# Patient Record
Sex: FEMALE | HISPANIC OR LATINO | Employment: STUDENT | URBAN - METROPOLITAN AREA
[De-identification: names, ages, dates, MRNs, and addresses within clinical notes are randomized per-mention and may not be internally consistent; named-entity substitution may affect disease eponyms.]

---
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
## 2019-01-23 DIAGNOSIS — I10 BENIGN ESSENTIAL HYPERTENSION: ICD-10-CM

## 2019-01-23 RX ORDER — ATENOLOL 50 MG/1
50 TABLET ORAL DAILY
Qty: 30 TABLET | Refills: 11 | Status: SHIPPED | OUTPATIENT
Start: 2019-01-23 | End: 2020-06-22

## 2019-03-06 DIAGNOSIS — F90.2 ADHD (ATTENTION DEFICIT HYPERACTIVITY DISORDER), COMBINED TYPE: ICD-10-CM

## 2019-03-06 RX ORDER — DEXTROAMPHETAMINE SACCHARATE, AMPHETAMINE ASPARTATE MONOHYDRATE, DEXTROAMPHETAMINE SULFATE AND AMPHETAMINE SULFATE 5; 5; 5; 5 MG/1; MG/1; MG/1; MG/1
20 CAPSULE, EXTENDED RELEASE ORAL DAILY
Qty: 30 CAPSULE | Refills: 0 | Status: SHIPPED | OUTPATIENT
Start: 2019-03-06

## 2019-03-06 NOTE — TELEPHONE ENCOUNTER
Telephone visit from 10/10/18  Assessment: 1.  Anxiety. Well-controlled with weekly CBT and current dose of escitalopram 15 mg po q D.  2.  ADHD.  Currently well-controlled on current dose of Adderall.  Mother will call me with need for refills.     Advice/instructions given to patient/guardian including prescriptions, follow up appointment or orders for diagnostic testing:  Follow-up during winter break, or sooner by phone as needed.    Routing to Dr. Marr.    Milla Owens RN

## 2019-03-06 NOTE — TELEPHONE ENCOUNTER
Rx: amphetamine-dextroamphetamine (ADDERALL XR) 20 MG hr capsule has been dropped at the [facility] as requested..Kenia Ross,

## 2019-03-06 NOTE — TELEPHONE ENCOUNTER
Reason for Call:  Medication or medication refill:    Do you use a [facility]?  Name of the pharmacy and phone number for the current request:  Patient     Name of the medication requested: amphetamine-dextroamphetamine (ADDERALL XR) 20 MG per 24 hr capsule ()    Other request:  Mom wondering if can get 1 month refill has appointment with you on 4-3-19 @ 7:00.  Mom hoping to tomorrow or Friday.   Any question please call.    Can we leave a detailed message on this number? YES    Phone number patient can be reached at: Home number on file [phone] (home)    Best Time:  Anytime     Call taken on 3/6/2019 at 12:07 PM by China Mcclellan

## 2019-03-31 ENCOUNTER — TRANSFERRED RECORDS (OUTPATIENT)
Dept: HEALTH INFORMATION MANAGEMENT | Facility: CLINIC | Age: 19
End: 2019-03-31

## 2019-04-01 ENCOUNTER — TELEPHONE (OUTPATIENT)
Dept: PEDIATRICS | Facility: CLINIC | Age: 19
End: 2019-04-01

## 2019-04-01 NOTE — TELEPHONE ENCOUNTER
Reason for Call:  Other Recent Hospitalization    Detailed comments: Mom is calling to let Dr Marr know that the patient was hospitalized on 3/31/19 with pan colitis and will likely be discharged tomorrow 04/2/19.  Mom states she will have the records sent to the clinic before Evette's appointment on 4/17/19    Phone Number Patient can be reached at: Cell number on file:    Telephone Information:   Kiara (mom) [phone]       Best Time: Any     Can we leave a detailed message on this number? YES    Call taken on 4/1/2019 at 6:31 PM by Yina Mahan

## 2019-04-10 ENCOUNTER — OFFICE VISIT (OUTPATIENT)
Dept: URGENT CARE | Facility: URGENT CARE | Age: 19
End: 2019-04-10
Payer: COMMERCIAL

## 2019-04-10 VITALS
HEART RATE: 75 BPM | OXYGEN SATURATION: 99 % | RESPIRATION RATE: 20 BRPM | DIASTOLIC BLOOD PRESSURE: 78 MMHG | BODY MASS INDEX: 29.04 KG/M2 | SYSTOLIC BLOOD PRESSURE: 120 MMHG | TEMPERATURE: 97.3 F | WEIGHT: 145 LBS

## 2019-04-10 DIAGNOSIS — R30.0 DYSURIA: ICD-10-CM

## 2019-04-10 DIAGNOSIS — N76.0 BV (BACTERIAL VAGINOSIS): Primary | ICD-10-CM

## 2019-04-10 DIAGNOSIS — B96.89 BV (BACTERIAL VAGINOSIS): Primary | ICD-10-CM

## 2019-04-10 LAB
ALBUMIN UR-MCNC: NEGATIVE MG/DL
APPEARANCE UR: CLEAR
BACTERIA #/AREA URNS HPF: ABNORMAL /HPF
BILIRUB UR QL STRIP: NEGATIVE
COLOR UR AUTO: YELLOW
GLUCOSE UR STRIP-MCNC: NEGATIVE MG/DL
HGB UR QL STRIP: ABNORMAL
KETONES UR STRIP-MCNC: NEGATIVE MG/DL
LEUKOCYTE ESTERASE UR QL STRIP: ABNORMAL
NITRATE UR QL: NEGATIVE
NON-SQ EPI CELLS #/AREA URNS LPF: ABNORMAL /LPF
PH UR STRIP: 5.5 PH (ref 5–7)
RBC #/AREA URNS AUTO: ABNORMAL /HPF
SOURCE: ABNORMAL
SP GR UR STRIP: >1.03 (ref 1–1.03)
SPECIMEN SOURCE: ABNORMAL
UROBILINOGEN UR STRIP-ACNC: 0.2 EU/DL (ref 0.2–1)
WBC #/AREA URNS AUTO: ABNORMAL /HPF
WET PREP SPEC: ABNORMAL

## 2019-04-10 PROCEDURE — 87086 URINE CULTURE/COLONY COUNT: CPT | Performed by: FAMILY MEDICINE

## 2019-04-10 PROCEDURE — 81001 URINALYSIS AUTO W/SCOPE: CPT | Performed by: PHYSICIAN ASSISTANT

## 2019-04-10 PROCEDURE — 87210 SMEAR WET MOUNT SALINE/INK: CPT | Performed by: PHYSICIAN ASSISTANT

## 2019-04-10 PROCEDURE — 99213 OFFICE O/P EST LOW 20 MIN: CPT | Performed by: FAMILY MEDICINE

## 2019-04-10 RX ORDER — METRONIDAZOLE 500 MG/1
500 TABLET ORAL 2 TIMES DAILY
Qty: 14 TABLET | Refills: 0 | Status: SHIPPED | OUTPATIENT
Start: 2019-04-10 | End: 2019-04-17

## 2019-04-11 LAB
BACTERIA SPEC CULT: NORMAL
SPECIMEN SOURCE: NORMAL

## 2019-04-11 NOTE — PROGRESS NOTES
SUBJECTIVE:   Kajal Doan is a 18 year old female who  presents today for a possible UTI. Symptoms of frequency have been going on for 3day(s).  Hematuria no.  gradual onset and worseningand moderate.  There is no history of fever, chills, nausea or vomiting.  No history of vaginal itchiness, no concerns for STD. This patient does not have a history of urinary tract infections.    Patient was seen recently at [facility] and [facility] that had screening done for STD and pregnancy and was negative.  Currently has Implanon    Past Medical History:   Diagnosis Date     Pediatric hypertension      Current Outpatient Medications   Medication Sig Dispense Refill     albuterol (PROAIR HFA, PROVENTIL HFA, VENTOLIN HFA) 108 (90 BASE) MCG/ACT inhaler Take two puffs 15 minutes before any exercise. 1 Inhaler 1     cholecalciferol (VITAMIN D3) 5000 UNITS CAPS capsule Take  by mouth daily.       FOLIC ACID PO Take 1 mg by mouth daily.       hydrocortisone 2.5 % cream Apply to affected areas around nose daily for 3-4 days, then stop. 30 g 3     amphetamine-dextroamphetamine (ADDERALL XR) 20 MG 24 hr capsule Take 1 capsule (20 mg) by mouth daily 30 capsule 0     atenolol (TENORMIN) 50 MG tablet Take 1 tablet (50 mg) by mouth daily 30 tablet 11     escitalopram (LEXAPRO) 10 MG tablet Take 1 tablet (10 mg) by mouth daily 30 tablet 3     FISH OIL        norgestim-eth estrad triphasic (ORTHO TRI-CYCLEN LO) 0.18/0.215/0.25 MG-25 MCG per tablet Take 1 tablet by mouth daily 84 tablet 1     Social History     Tobacco Use     Smoking status: Never Smoker     Smokeless tobacco: Never Used     Tobacco comment: non smoking home   Substance Use Topics     Alcohol use: No       ROS:   Review of systems negative except as stated above.    OBJECTIVE:  /78 (BP Location: Right arm, Patient Position: Left side, Cuff Size: Adult Small)   Pulse 75   Temp 97.3  F (36.3  C)   Resp 20   Wt 65.8 kg (145 lb)   SpO2 99%   BMI 29.04 kg/m     GENERAL APPEARANCE: healthy, alert and no distress  PSYCH: mentation appears normal and affect normal/bright    Results for orders placed or performed in visit on 04/10/19   UA reflex to Microscopic and Culture   Result Value Ref Range    Color Urine Yellow     Appearance Urine Clear     Glucose Urine Negative NEG^Negative mg/dL    Bilirubin Urine Negative NEG^Negative    Ketones Urine Negative NEG^Negative mg/dL    Specific Gravity Urine >1.030 1.003 - 1.035    Blood Urine Trace (A) NEG^Negative    pH Urine 5.5 5.0 - 7.0 pH    Protein Albumin Urine Negative NEG^Negative mg/dL    Urobilinogen Urine 0.2 0.2 - 1.0 EU/dL    Nitrite Urine Negative NEG^Negative    Leukocyte Esterase Urine Trace (A) NEG^Negative    Source Midstream Urine    Urine Microscopic   Result Value Ref Range    WBC Urine 0 - 5 OTO5^0 - 5 /HPF    RBC Urine O - 2 OTO2^O - 2 /HPF    Squamous Epithelial /LPF Urine Few FEW^Few /LPF    Bacteria Urine Few (A) NEG^Negative /HPF   Wet prep   Result Value Ref Range    Specimen Description Vagina     Wet Prep No yeast seen     Wet Prep No Trichomonas seen     Wet Prep Clue cells seen (A)     Wet Prep WBC'S seen        ASSESSMENT/PLAN:   (N76.0,  B96.89) BV (bacterial vaginosis)  (primary encounter diagnosis)  Plan: metroNIDAZOLE (FLAGYL) 500 MG tablet            (R30.0) Dysuria  Plan: UA reflex to Microscopic and Culture, Wet prep,        Urine Microscopic, Urine Culture Aerobic         Bacterial            Reviewed initial labs, reassurance given, drink plenty of fluids.  Will follow up on urine culture and treat if true positive for UTI.  Prevention and treatment of UTI's discussed.Signs and symptoms of pyelonephritis mentioned.  Reviewed frequent recurrence of BV, desires treatment so will give RX Flagyl.  no alcohol use with this antibiotic.  Encourage safe sex, condom usage.  Declined STD screening.    Follow up with primary provider if no improvement of symptoms in 1 week    Maximus Simms MD  April 10,  2019 7:34 PM

## 2019-04-11 NOTE — PATIENT INSTRUCTIONS
Take full course of Flagyl (metronidazole) to treat bacterial vaginosis (clue cells). No alcohol use as this will cause you to be sick /bad side effects  We will contact you if the urine culture is positive for true infection - you will need a new antibiotic for this.    No sex for 1 week, use condoms to minimize spread of clue cells.          Patient Education     Vaginal Infection: Bacterial Vaginosis  Both good and bad bacteria are present in a healthy vagina. Bacterial vaginosis (BV) occurs when these bacteria get out of balance. The numbers of good bacteria decrease. This allows the numbers of bad bacteria to increase and cause BV. In most cases, BV is not a serious problem.  Causes of bacterial vaginosis  The cause of BV is not clear. Douching may lead to it. Having sex with a new partner or more than 1 partner makes it more likely.  Symptoms of bacterial vaginosis  Symptoms of BV vary for each woman. Some women have few symptoms or none at all. If symptoms are present, they can include:    Thin, milky white or gray or sometimes green discharge    Unpleasant  fishy  odor    Irritation, itching, and burning at opening of vagina which may indicate mixed vaginitis      Burning or irritation with sex or urination which may indicate mixed vaginitis  Diagnosing bacterial vaginosis  Your healthcare provider will ask about your symptoms and health history. He or she will also do a pelvic exam. This is an exam of your vagina and cervix. A sample of vaginal fluid or discharge may be taken. This sample is checked for signs of BV.  Treating bacterial vaginosis  BV is often treated with antibiotics. They may be given in oral pill form or as a vaginal cream. To use these medicines:    Be sure to take all of your medicine, even if your symptoms go away.    If you re taking antibiotic pills, do not drink alcohol until you re finished with all of your medicine.    If you re using vaginal cream, apply it as directed. Be aware  "that the cream may make condoms and diaphragms less effective.    Call your healthcare provider if symptoms do not go away within 4 days of starting treatment. Also call if you have a reaction to the medicine.  Why treatment matters  Even if you have no symptoms or your symptoms are mild, BV should be treated. Untreated BV can lead to health problems such as:    Increased risk of  delivery if you re pregnant    Increased risk of complications after surgery on the reproductive organs    Possible increased risk of pelvic inflammatory disease (PID)   Date Last Reviewed: 3/1/2017    5129-5772 [employer]. [address] [place] [address]. All rights reserved. This information is not intended as a substitute for professional medical care. Always follow your healthcare professional's instructions.           Patient Education     Dysuria     Painful urination (dysuria) is often caused by a problem in the urinary tract.   Dysuria is pain felt during urination. It is often described as a burning. Learn more about this problem and how it can be treated.  What causes dysuria?  Possible causes include:    Infection with a bacteria or virus such as a urinary tract infection (UTI or a sexually transmitted infection (STI)    Sensitivity or allergy to chemicals such as those found in lotions and other products    Prostate or bladder problems    Radiation therapy to the pelvic area  How is dysuria diagnosed?  Your healthcare provider will examine you. He or she will ask about your symptoms and health. After talking with you and doing a physical exam, your healthcare provider may know what is causing your dysuria. He or she will usually request  a sample of your urine. Tests of your urine, or a \"urinalysis,\" are done. A urinalysis may include:    Looking at the urine sample (visual exam)    Checking for substances (chemical exam)    Looking at a small amount under a microscope (microscopic exam)  Some " parts of the urinalysis may be done in the provider's office and some in a lab. And, the urine sample may be checked for bacteria and yeast (urine culture). Your healthcare provider will tell you more about these tests if they are needed.  How is dysuria treated?  Treatment depends on the cause. If you have a bacterial infection, you may need antibiotics. You may be given medicines to make it easier for you to urinate and help relieve pain. Your healthcare provider can tell you more about your treatment options. Untreated, symptoms may get worse.  When to call your healthcare provider  Call the healthcare provider right away if you have any of the following:    Fever of 100.4 F (38 C) or higher     No improvement after three days of treatment    Trouble urinating because of pain    New or increased discharge from the vagina or penis    Rash or joint pain    Increased back or abdominal pain    Enlarged painful lymph nodes (lumps) in the groin   Date Last Reviewed: 1/1/2017 2000-2018 [employer]. [address] [place] [address], [place] [address]. All rights reserved. This information is not intended as a substitute for professional medical care. Always follow your healthcare professional's instructions.

## 2019-04-17 ENCOUNTER — OFFICE VISIT (OUTPATIENT)
Dept: PEDIATRICS | Facility: CLINIC | Age: 19
End: 2019-04-17
Payer: COMMERCIAL

## 2019-04-17 VITALS
DIASTOLIC BLOOD PRESSURE: 78 MMHG | HEART RATE: 78 BPM | HEIGHT: 59 IN | SYSTOLIC BLOOD PRESSURE: 120 MMHG | WEIGHT: 168.6 LBS | TEMPERATURE: 97.8 F | BODY MASS INDEX: 33.99 KG/M2

## 2019-04-17 DIAGNOSIS — F41.1 GENERALIZED ANXIETY DISORDER: Primary | ICD-10-CM

## 2019-04-17 DIAGNOSIS — F90.2 ADHD (ATTENTION DEFICIT HYPERACTIVITY DISORDER), COMBINED TYPE: ICD-10-CM

## 2019-04-17 DIAGNOSIS — Z11.3 SCREEN FOR STD (SEXUALLY TRANSMITTED DISEASE): ICD-10-CM

## 2019-04-17 DIAGNOSIS — J45.990 EXERCISE-INDUCED ASTHMA: ICD-10-CM

## 2019-04-17 DIAGNOSIS — Z59.00 HOUSING LACK: ICD-10-CM

## 2019-04-17 PROCEDURE — 87591 N.GONORRHOEAE DNA AMP PROB: CPT | Performed by: PEDIATRICS

## 2019-04-17 PROCEDURE — 99215 OFFICE O/P EST HI 40 MIN: CPT | Performed by: PEDIATRICS

## 2019-04-17 PROCEDURE — 87491 CHLMYD TRACH DNA AMP PROBE: CPT | Performed by: PEDIATRICS

## 2019-04-17 RX ORDER — DEXTROAMPHETAMINE SACCHARATE, AMPHETAMINE ASPARTATE MONOHYDRATE, DEXTROAMPHETAMINE SULFATE AND AMPHETAMINE SULFATE 5; 5; 5; 5 MG/1; MG/1; MG/1; MG/1
20 CAPSULE, EXTENDED RELEASE ORAL DAILY
Qty: 30 CAPSULE | Refills: 0 | Status: SHIPPED | OUTPATIENT
Start: 2019-04-17 | End: 2019-05-17

## 2019-04-17 RX ORDER — ESCITALOPRAM OXALATE 10 MG/1
10 TABLET ORAL DAILY
Qty: 30 TABLET | Refills: 3 | Status: SHIPPED | OUTPATIENT
Start: 2019-04-17 | End: 2019-10-16

## 2019-04-17 RX ORDER — DEXTROAMPHETAMINE SACCHARATE, AMPHETAMINE ASPARTATE MONOHYDRATE, DEXTROAMPHETAMINE SULFATE AND AMPHETAMINE SULFATE 5; 5; 5; 5 MG/1; MG/1; MG/1; MG/1
20 CAPSULE, EXTENDED RELEASE ORAL DAILY
Qty: 30 CAPSULE | Refills: 0 | Status: SHIPPED | OUTPATIENT
Start: 2019-05-18 | End: 2019-06-17

## 2019-04-17 RX ORDER — DEXTROAMPHETAMINE SACCHARATE, AMPHETAMINE ASPARTATE MONOHYDRATE, DEXTROAMPHETAMINE SULFATE AND AMPHETAMINE SULFATE 5; 5; 5; 5 MG/1; MG/1; MG/1; MG/1
20 CAPSULE, EXTENDED RELEASE ORAL DAILY
Qty: 30 CAPSULE | Refills: 0 | Status: SHIPPED | OUTPATIENT
Start: 2019-06-18 | End: 2019-07-18

## 2019-04-17 SDOH — ECONOMIC STABILITY - HOUSING INSECURITY: HOMELESSNESS UNSPECIFIED: Z59.00

## 2019-04-17 ASSESSMENT — MIFFLIN-ST. JEOR: SCORE: 1440

## 2019-04-17 ASSESSMENT — PATIENT HEALTH QUESTIONNAIRE - PHQ9
5. POOR APPETITE OR OVEREATING: MORE THAN HALF THE DAYS
SUM OF ALL RESPONSES TO PHQ QUESTIONS 1-9: 5

## 2019-04-17 ASSESSMENT — ANXIETY QUESTIONNAIRES
IF YOU CHECKED OFF ANY PROBLEMS ON THIS QUESTIONNAIRE, HOW DIFFICULT HAVE THESE PROBLEMS MADE IT FOR YOU TO DO YOUR WORK, TAKE CARE OF THINGS AT HOME, OR GET ALONG WITH OTHER PEOPLE: SOMEWHAT DIFFICULT
3. WORRYING TOO MUCH ABOUT DIFFERENT THINGS: SEVERAL DAYS
7. FEELING AFRAID AS IF SOMETHING AWFUL MIGHT HAPPEN: NOT AT ALL
1. FEELING NERVOUS, ANXIOUS, OR ON EDGE: MORE THAN HALF THE DAYS
GAD7 TOTAL SCORE: 8
5. BEING SO RESTLESS THAT IT IS HARD TO SIT STILL: NOT AT ALL
6. BECOMING EASILY ANNOYED OR IRRITABLE: MORE THAN HALF THE DAYS
2. NOT BEING ABLE TO STOP OR CONTROL WORRYING: SEVERAL DAYS
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
## 2019-04-18 PROBLEM — Z59.00 HOUSING LACK: Status: ACTIVE | Noted: 2019-04-18

## 2019-04-18 ASSESSMENT — ANXIETY QUESTIONNAIRES: GAD7 TOTAL SCORE: 8

## 2019-04-19 LAB
N GONORRHOEA DNA SPEC QL NAA+PROBE: NEGATIVE
SPECIMEN SOURCE: NORMAL

## 2019-04-21 LAB
C TRACH DNA SPEC QL NAA+PROBE: POSITIVE
SPECIMEN SOURCE: ABNORMAL

## 2019-04-22 ENCOUNTER — PATIENT OUTREACH (OUTPATIENT)
Dept: CARE COORDINATION | Facility: CLINIC | Age: 19
End: 2019-04-22

## 2019-04-22 ENCOUNTER — TELEPHONE (OUTPATIENT)
Dept: PEDIATRICS | Facility: CLINIC | Age: 19
End: 2019-04-22

## 2019-04-22 DIAGNOSIS — A74.9 CHLAMYDIA: Primary | ICD-10-CM

## 2019-04-22 NOTE — RESULT ENCOUNTER NOTE
Chlamydia is positive.  Please call patient's cell phone (not mother) with results and get pharmacy info.    CHRISSIE BEE MD

## 2019-04-22 NOTE — PROGRESS NOTES
Clinic Care Coordination Contact  [facility]/Voicemail    Referral Source: PCP  Clinical Data:  Outreach  Outreach attempted x 1. SW left a message on Pt's voicemail with call back information and requested return call.  Plan: SW will attempt outreach again in 3-5 business days.     JUAN JOSE Taylor, [facility]  , Care Coordination   [facility]  [phone]  [email or web address]

## 2019-04-22 NOTE — TELEPHONE ENCOUNTER
Result Notes for Chlamydia trachomatis PCR     Notes recorded by Chrissie Bee MD on 4/22/2019 at 7:58 AM CDT  Chlamydia is positive.  Please call patient's cell phone (not mother) with results and get pharmacy info.    CHRISSIE BEE MD

## 2019-04-22 NOTE — TELEPHONE ENCOUNTER
Reported positive Chlamydia result to [facility]. Andrea (MD) would like us to call her back at [phone] once we hear from patient and she is treated.     Luh Morrison RN, IBCLC

## 2019-04-22 NOTE — TELEPHONE ENCOUNTER
Patient was instructed to return call to [facility]'s [facility] RN directly on the RN Call Back Line at [phone].    Joanne Esteban RN

## 2019-04-23 NOTE — TELEPHONE ENCOUNTER
Attempted to call Kajal a few times this morning, no answer and mailbox is full. Will try again later.   Luh Morrison RN, IBCLC

## 2019-04-23 NOTE — TELEPHONE ENCOUNTER
I called repeatedly with someone answering and hanging up many times.  Then a male answered her phone.  I asked him to have Evette call us back.   He states he will let her know we called. Sachi Clark RN

## 2019-04-24 ENCOUNTER — ALLIED HEALTH/NURSE VISIT (OUTPATIENT)
Dept: NURSING | Facility: CLINIC | Age: 19
End: 2019-04-24
Payer: COMMERCIAL

## 2019-04-24 ENCOUNTER — TELEPHONE (OUTPATIENT)
Dept: PEDIATRICS | Facility: CLINIC | Age: 19
End: 2019-04-24

## 2019-04-24 DIAGNOSIS — A74.9 CHLAMYDIA INFECTION: Primary | ICD-10-CM

## 2019-04-24 PROCEDURE — 99207 ZZC NO CHARGE NURSE ONLY: CPT

## 2019-04-24 RX ORDER — AZITHROMYCIN 500 MG/1
1000 TABLET, FILM COATED ORAL DAILY
Qty: 2 TABLET | Refills: 0 | Status: SHIPPED | OUTPATIENT
Start: 2019-04-24 | End: 2019-10-16

## 2019-04-24 NOTE — PROGRESS NOTES
This RN observed patient take azithromycin 500 mg 2 tabs orally. Patient counseled to notify partner and for them to be treated.    [facility] STD Case Report completed and faxed. Placed in TC to process to make a copy for chart.    Milla Owens RN

## 2019-04-24 NOTE — Clinical Note
Please review, is this the correct way to document so patient doesn't get charged? Just observed her take med

## 2019-04-24 NOTE — TELEPHONE ENCOUNTER
Spoke to Kajal. I informed her of result. Schedule nurse only appointment for tonight to watch her take medication.     Routing to  for order.     Luh Morrison RN, IBCLC

## 2019-04-24 NOTE — LETTER
April 24, 2019      Kajal Doan  [address] [address]        To Whom It May Concern:    Kajal Doan should be excused from school from April 1-5 due to an illness.      Sincerely,          Rishi Chan MD  4/24/2019 1:37 PM

## 2019-04-24 NOTE — TELEPHONE ENCOUNTER
Reason for Call:  Other Letter for School    Detailed comments: Mom calling in to request a letter for patient's school.   Patient was hospitalized on 3/31 at [facility].  Mom states she was discharged from hospital on April 3 and then was home for 2 days.  Therefore, patient missed the whole week of school from April 1-5, 2019.  Mom is requesting that this be done as soon as possible and she realizes that Dr. Marr is away from clinic.  Can anyone else on the team put this together?  Please call mom if there are any questions about her request.   No form in file for mom to communicate with clinic.  May have to call patient for this.  Otherwise, mom's cell is [phone].    Phone Number Patient can be reached at: [phone]    Best Time: anytime    Can we leave a detailed message on this number? YES    Call taken on 4/24/2019 at 12:31 PM by Teena Andrade

## 2019-04-24 NOTE — LETTER
2019      Kajal Doan  : [birth year]  [address] [address]        To Whom It May Concern:    Kajal was absent from school due to illness from  to 2019.  Please excuse this absence.        Sincerely,              Rishi Chan MD

## 2019-04-25 NOTE — TELEPHONE ENCOUNTER
At office visit, Kajal questioned whether or not we can tell how long she has been infected with chlamydia? She is concerned with possible infertility and would like to know if she should be concerned about this as she was asymptomatic?    Milla Owens RN

## 2019-04-25 NOTE — TELEPHONE ENCOUNTER
Unfortunately, we cannot tell when she was infected.  If she has further questions, she can discuss it with Dr. Marr.      CHRISSIE BEE MD

## 2019-04-26 NOTE — TELEPHONE ENCOUNTER
Notified Isablel,  She would like a call back from Dr. Marr to discuss other questions when she is available.    Milla Owens RN

## 2019-05-02 NOTE — PROGRESS NOTES
Clinic Care Coordination Contact  [facility]/Voicemail     Referral Source: PCP  Clinical Data:  Outreach  Outreach attempted x 2. SW unable to leave a message as Pt's VM box is full.   Plan: SW aware Pt is scheduled for clinic visit(s) tomorrow and next Thursday. The visit on 5/9 is with Pt's PCP. SW will review update(s) following those appointments and determine whether additional outreach is needed should Pt not return writer's call.      JUAN JOSE Taylor, [facility]  , Care Coordination   [facility]  [phone]  [email or web address]    [FreeTextEntry1] : acute sinusitis - start salt water gargles Increase po fluid intake to maintain adequate hydration. Rest. Start course of augmentin. if no improvement in symptoms may need to switch antibx

## 2019-05-07 NOTE — TELEPHONE ENCOUNTER
I called Evette's number but the mailbox was full, and so I could not leave a message.  I will try again tomorrow.

## 2019-05-10 NOTE — PROGRESS NOTES
Clinic Care Coordination Contact    Situation: Patient chart reviewed by .     Background: SW attempted outreach to Pt x2 as noted below. SW planned to review follow-up from clinic visit scheduled on 5/9 and determine whether additional outreach is needed.     Assessment: SW aware Pt's appointment on 5/9 was cancelled and rescheduled with PCP on 5/29 at 7pm.     Plan/Recommendations: SW will follow-up and review outcome of visit from 5/29 to determine whether additional follow-up/outreach is needed.     JUAN JOSE Taylor, [facility]  , Care Coordination   [facility]  [phone]  [email or web address]

## 2019-05-30 ENCOUNTER — PATIENT OUTREACH (OUTPATIENT)
Dept: CARE COORDINATION | Facility: CLINIC | Age: 19
End: 2019-05-30

## 2019-05-30 NOTE — PROGRESS NOTES
Clinic Care Coordination Contact    Situation: Patient chart reviewed by .     Background: SW has attempted to reach Pt x2 with no CB at this time. SW has reviewed Pt's chart following outreaches and understand Pt has scheduled appointments however she has either cancelled or no-showed.     Assessment: Pt is scheduled to follow-up with PCP on 6/5 at 7pm.     Plan/Recommendations: SW available following the appointment if CC needs persist and Pt is open to contact from writer.     JUAN JOSE Taylor, [facility]  , Care Coordination   [facility]  [phone]  [facility] [place] [email or web address]

## 2019-06-05 ENCOUNTER — OFFICE VISIT (OUTPATIENT)
Dept: PEDIATRICS | Facility: CLINIC | Age: 19
End: 2019-06-05
Payer: COMMERCIAL

## 2019-06-05 VITALS — TEMPERATURE: 97.1 F | HEIGHT: 58 IN | WEIGHT: 175.5 LBS | BODY MASS INDEX: 36.84 KG/M2

## 2019-06-05 DIAGNOSIS — Z11.3 SCREEN FOR STD (SEXUALLY TRANSMITTED DISEASE): Primary | ICD-10-CM

## 2019-06-05 PROCEDURE — 87591 N.GONORRHOEAE DNA AMP PROB: CPT | Performed by: PEDIATRICS

## 2019-06-05 PROCEDURE — 99214 OFFICE O/P EST MOD 30 MIN: CPT | Performed by: PEDIATRICS

## 2019-06-05 PROCEDURE — 87491 CHLMYD TRACH DNA AMP PROBE: CPT | Performed by: PEDIATRICS

## 2019-06-05 ASSESSMENT — MIFFLIN-ST. JEOR: SCORE: 1466.94

## 2019-06-06 ASSESSMENT — ASTHMA QUESTIONNAIRES: ACT_TOTALSCORE: 21
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
## 2019-06-07 LAB
C TRACH DNA SPEC QL NAA+PROBE: NEGATIVE
N GONORRHOEA DNA SPEC QL NAA+PROBE: NEGATIVE
SPECIMEN SOURCE: NORMAL
SPECIMEN SOURCE: NORMAL

## 2019-06-20 ENCOUNTER — OFFICE VISIT (OUTPATIENT)
Dept: URGENT CARE | Facility: URGENT CARE | Age: 19
End: 2019-06-20
Payer: COMMERCIAL

## 2019-06-20 VITALS
BODY MASS INDEX: 36.71 KG/M2 | HEART RATE: 71 BPM | TEMPERATURE: 97.9 F | DIASTOLIC BLOOD PRESSURE: 89 MMHG | OXYGEN SATURATION: 98 % | SYSTOLIC BLOOD PRESSURE: 146 MMHG | WEIGHT: 178 LBS

## 2019-06-20 DIAGNOSIS — B37.31 VULVOVAGINAL CANDIDIASIS: ICD-10-CM

## 2019-06-20 DIAGNOSIS — R30.0 DYSURIA: ICD-10-CM

## 2019-06-20 DIAGNOSIS — N89.8 VAGINAL ITCHING: Primary | ICD-10-CM

## 2019-06-20 LAB
ALBUMIN UR-MCNC: NEGATIVE MG/DL
APPEARANCE UR: CLEAR
BACTERIA #/AREA URNS HPF: ABNORMAL /HPF
BILIRUB UR QL STRIP: NEGATIVE
COLOR UR AUTO: YELLOW
GLUCOSE UR STRIP-MCNC: NEGATIVE MG/DL
HGB UR QL STRIP: ABNORMAL
KETONES UR STRIP-MCNC: NEGATIVE MG/DL
LEUKOCYTE ESTERASE UR QL STRIP: ABNORMAL
NITRATE UR QL: NEGATIVE
NON-SQ EPI CELLS #/AREA URNS LPF: ABNORMAL /LPF
PH UR STRIP: 6.5 PH (ref 5–7)
RBC #/AREA URNS AUTO: ABNORMAL /HPF
SOURCE: ABNORMAL
SP GR UR STRIP: >1.03 (ref 1–1.03)
SPECIMEN SOURCE: NORMAL
UROBILINOGEN UR STRIP-ACNC: 0.2 EU/DL (ref 0.2–1)
WBC #/AREA URNS AUTO: ABNORMAL /HPF
WET PREP SPEC: NORMAL

## 2019-06-20 PROCEDURE — 99213 OFFICE O/P EST LOW 20 MIN: CPT | Performed by: PHYSICIAN ASSISTANT

## 2019-06-20 PROCEDURE — 87210 SMEAR WET MOUNT SALINE/INK: CPT | Performed by: PHYSICIAN ASSISTANT

## 2019-06-20 PROCEDURE — 81001 URINALYSIS AUTO W/SCOPE: CPT | Performed by: PHYSICIAN ASSISTANT

## 2019-06-20 RX ORDER — MICONAZOLE NITRATE 20 MG/G
CREAM TOPICAL 2 TIMES DAILY
Qty: 15 G | Refills: 0 | Status: SHIPPED | OUTPATIENT
Start: 2019-06-20 | End: 2019-06-25

## 2019-06-20 RX ORDER — FLUCONAZOLE 150 MG/1
150 TABLET ORAL ONCE
Qty: 2 TABLET | Refills: 0 | Status: SHIPPED | OUTPATIENT
Start: 2019-06-20 | End: 2019-06-20

## 2019-06-20 NOTE — PATIENT INSTRUCTIONS
You do not have a urinary tract infection.  Your wet prep (vaginal test) came back negative for infection.  However, I think you do have a vaginal yeast infection based on how the vagina looks on exam.    Take Diflucan pill once today.  Start monistat cream to the vulva and vaginal area 2 times daily x 3-5 days.    If no improvement in 1 week, see your doctor. Follow up sooner if new/worsening symptoms.

## 2019-06-20 NOTE — PROGRESS NOTES
SUBJECTIVE:   Kajal Doan is a 18 year old female presenting with a chief complaint of   Chief Complaint   Patient presents with     Vaginal Itching     4 days     Dysuria   .      Admits to pain in vaginal area after urinating and after sex x 4 days. No vaginal sores. No vaginal discharge. No vaginal bleeding. LMP- does not menstruate, has Nexplanon.  No painful urination. No urinary frequency. No abdominal pain, flank pain, fever. No vomiting.  Is sexually active- 1 male partner in the last 6 months. Just had STI testing for GC and Chlamydia on 6/5/19 with her PCP -both negative.  Has not tried any treatments.    ROS   See HPI      PMH:  Past Medical History:   Diagnosis Date     Pediatric hypertension      Patient Active Problem List   Diagnosis     ADHD (attention deficit hyperactivity disorder)     Needle phobia     Proteinuria     Deliberate self-cutting     Insomnia     Exercise-induced asthma     Other secondary hypertension     Dysmenorrhea     YESI (generalized anxiety disorder)     Housing lack         Current Medications:  Current Outpatient Medications   Medication Sig Dispense Refill     albuterol (PROAIR HFA, PROVENTIL HFA, VENTOLIN HFA) 108 (90 BASE) MCG/ACT inhaler Take two puffs 15 minutes before any exercise. 1 Inhaler 1     amphetamine-dextroamphetamine (ADDERALL XR) 20 MG 24 hr capsule Take 1 capsule (20 mg) by mouth daily 30 capsule 0     amphetamine-dextroamphetamine (ADDERALL XR) 20 MG 24 hr capsule Take 1 capsule (20 mg) by mouth daily 30 capsule 0     atenolol (TENORMIN) 50 MG tablet Take 1 tablet (50 mg) by mouth daily 30 tablet 11     cholecalciferol (VITAMIN D3) 5000 UNITS CAPS capsule Take  by mouth daily.       escitalopram (LEXAPRO) 10 MG tablet Take 1 tablet (10 mg) by mouth daily 30 tablet 3     FISH OIL        fluconazole (DIFLUCAN) 150 MG tablet Take 1 tablet (150 mg) by mouth once for 1 dose Take 1 tablet x 1. May repeat dose in 72 hours IF persistent symptoms. 2 tablet 0     FOLIC  ACID PO Take 1 mg by mouth daily.       hydrocortisone 2.5 % cream Apply to affected areas around nose daily for 3-4 days, then stop. 30 g 3     miconazole (MICATIN) 2 % external cream Apply topically 2 times daily for 5 days 15 g 0       Surgical History:  No past surgical history on file.    Family History:  Family History   Problem Relation Age of Onset     Diabetes Mother        Social History:  Social History     Tobacco Use     Smoking status: Never Smoker     Smokeless tobacco: Never Used     Tobacco comment: non smoking home   Substance Use Topics     Alcohol use: No       OBJECTIVE  /89   Pulse 71   Temp 97.9  F (36.6  C) (Tympanic)   Wt 80.7 kg (178 lb)   SpO2 98%   BMI 36.71 kg/m      General: alert, appears comfortable, NAD. Afebrile.  Respiratory: No distress.   : Normal female external genitalia. Erythema of labia minora and outside vaginal introitus. No discharge.   Neurologic: Follows commands. Gait normal.   Psychiatric: mentation appears normal and affect normal/bright         Labs:  Results for orders placed or performed in visit on 06/20/19 (from the past 24 hour(s))   *UA reflex to Microscopic and Culture ([place] [facility] (except [place] and [place])   Result Value Ref Range    Color Urine Yellow     Appearance Urine Clear     Glucose Urine Negative NEG^Negative mg/dL    Bilirubin Urine Negative NEG^Negative    Ketones Urine Negative NEG^Negative mg/dL    Specific Gravity Urine >1.030 1.003 - 1.035    Blood Urine Trace (A) NEG^Negative    pH Urine 6.5 5.0 - 7.0 pH    Protein Albumin Urine Negative NEG^Negative mg/dL    Urobilinogen Urine 0.2 0.2 - 1.0 EU/dL    Nitrite Urine Negative NEG^Negative    Leukocyte Esterase Urine Trace (A) NEG^Negative    Source Midstream Urine    Urine Microscopic   Result Value Ref Range    WBC Urine 0 - 5 OTO5^0 - 5 /HPF    RBC Urine O - 2 OTO2^O - 2 /HPF    Squamous Epithelial /LPF Urine Moderate (A) FEW^Few /LPF    Bacteria Urine Few  (A) NEG^Negative /HPF   Wet prep   Result Value Ref Range    Specimen Description Vagina     Wet Prep No Trichomonas seen     Wet Prep No clue cells seen     Wet Prep No yeast seen     Wet Prep No WBC's seen              ASSESSMENT/PLAN:    ICD-10-CM    1. Vaginal itching N89.8 Wet prep   2. Dysuria R30.0 *UA reflex to Microscopic and Culture ([place] and [facility] (except [place] and [place])     Urine Microscopic   3. Vulvovaginal candidiasis B37.3 fluconazole (DIFLUCAN) 150 MG tablet     miconazole (MICATIN) 2 % external cream           Medical Decision Making:        Serious Comorbid Conditions: HTN    Differential Diagnosis:   BV  Yeast vaginitis  Cystitis- doubt as no irritative voiding symptoms- more pain in vaginal area after urination. UA today showed only squamous cells and few bacteria but no WBCs.    Wet prep was negative.    However, on my vaginal external exam today, I did note findings suggestive of yeast.  I did not see any vaginal sores suggestive of Herpes.    Diflucan prescribed. Miconazole external cream also prescribed. She is to follow up with her doctor if no improvement in 1 week.     At the end of the encounter, I discussed all available results, as well as the diagnosis and any associated medications. I discussed red flags for immediate return to clinic/ER, as well as indications for follow up. Refer to patient instructions below, which were all addressed with patient. Patient understood and agreed to plan. Patient was appropriate for discharge.      Patient Instructions   You do not have a urinary tract infection.  Your wet prep (vaginal test) came back negative for infection.  However, I think you do have a vaginal yeast infection based on how the vagina looks on exam.    Take Diflucan pill once today.  Start monistat cream to the vulva and vaginal area 2 times daily x 3-5 days.    If no improvement in 1 week, see your doctor. Follow up sooner if new/worsening  symptoms.            Michelle Ndiaye PA-C

## 2019-08-16 ENCOUNTER — TELEPHONE (OUTPATIENT)
Dept: PEDIATRICS | Facility: CLINIC | Age: 19
End: 2019-08-16

## 2019-08-16 NOTE — TELEPHONE ENCOUNTER
Reason for Call: Request for an order or referral:    Order or referral being requested: OB-GYN    Date needed: as soon as possible    Has the patient been seen by the PCP for this problem? YES    Additional comments: Patient's mom called and requested an OB-GYN referral for patient.    Phone number Patient can be reached at:  Cell number on file:    Telephone Information:   Mobile [phone]     Best Time:  Anytime    Can we leave a detailed message on this number?  YES    Call taken on 8/16/2019 at 4:16 PM by Audrey Harvey

## 2019-08-27 NOTE — TELEPHONE ENCOUNTER
I called and spoke with mother.  Evette needs an appointment to discuss dysfunctional uterine bleeding.  I told mother she can follow up with her gyn doctor she has seen in the past (no referral needed), or she can come to see me.  Mother expressed understanding.

## 2019-10-16 ENCOUNTER — OFFICE VISIT (OUTPATIENT)
Dept: PEDIATRICS | Facility: CLINIC | Age: 19
End: 2019-10-16
Payer: COMMERCIAL

## 2019-10-16 VITALS — TEMPERATURE: 97.2 F | WEIGHT: 186.38 LBS | BODY MASS INDEX: 39.12 KG/M2 | HEIGHT: 58 IN

## 2019-10-16 DIAGNOSIS — F90.2 ATTENTION DEFICIT HYPERACTIVITY DISORDER (ADHD), COMBINED TYPE: ICD-10-CM

## 2019-10-16 DIAGNOSIS — F41.1 GAD (GENERALIZED ANXIETY DISORDER): ICD-10-CM

## 2019-10-16 DIAGNOSIS — Z23 NEED FOR PROPHYLACTIC VACCINATION AND INOCULATION AGAINST INFLUENZA: ICD-10-CM

## 2019-10-16 DIAGNOSIS — N64.3 GALACTORRHEA: Primary | ICD-10-CM

## 2019-10-16 LAB — HCG UR QL: NEGATIVE

## 2019-10-16 PROCEDURE — 36415 COLL VENOUS BLD VENIPUNCTURE: CPT | Performed by: PEDIATRICS

## 2019-10-16 PROCEDURE — 81025 URINE PREGNANCY TEST: CPT | Performed by: PEDIATRICS

## 2019-10-16 PROCEDURE — 84146 ASSAY OF PROLACTIN: CPT | Performed by: PEDIATRICS

## 2019-10-16 PROCEDURE — 84439 ASSAY OF FREE THYROXINE: CPT | Performed by: PEDIATRICS

## 2019-10-16 PROCEDURE — 99215 OFFICE O/P EST HI 40 MIN: CPT | Mod: 25 | Performed by: PEDIATRICS

## 2019-10-16 PROCEDURE — 90686 IIV4 VACC NO PRSV 0.5 ML IM: CPT | Performed by: PEDIATRICS

## 2019-10-16 PROCEDURE — 90471 IMMUNIZATION ADMIN: CPT | Performed by: PEDIATRICS

## 2019-10-16 PROCEDURE — 84443 ASSAY THYROID STIM HORMONE: CPT | Performed by: PEDIATRICS

## 2019-10-16 RX ORDER — DEXTROAMPHETAMINE SACCHARATE, AMPHETAMINE ASPARTATE MONOHYDRATE, DEXTROAMPHETAMINE SULFATE AND AMPHETAMINE SULFATE 5; 5; 5; 5 MG/1; MG/1; MG/1; MG/1
20 CAPSULE, EXTENDED RELEASE ORAL DAILY
Qty: 30 CAPSULE | Refills: 0 | Status: SHIPPED | OUTPATIENT
Start: 2019-11-16 | End: 2019-12-16

## 2019-10-16 RX ORDER — DEXTROAMPHETAMINE SACCHARATE, AMPHETAMINE ASPARTATE MONOHYDRATE, DEXTROAMPHETAMINE SULFATE AND AMPHETAMINE SULFATE 5; 5; 5; 5 MG/1; MG/1; MG/1; MG/1
20 CAPSULE, EXTENDED RELEASE ORAL DAILY
Qty: 30 CAPSULE | Refills: 0 | Status: SHIPPED | OUTPATIENT
Start: 2019-12-17 | End: 2020-01-16

## 2019-10-16 RX ORDER — ESCITALOPRAM OXALATE 10 MG/1
10 TABLET ORAL DAILY
Qty: 30 TABLET | Refills: 3 | Status: SHIPPED | OUTPATIENT
Start: 2019-10-16 | End: 2019-11-15

## 2019-10-16 RX ORDER — DEXTROAMPHETAMINE SACCHARATE, AMPHETAMINE ASPARTATE MONOHYDRATE, DEXTROAMPHETAMINE SULFATE AND AMPHETAMINE SULFATE 5; 5; 5; 5 MG/1; MG/1; MG/1; MG/1
20 CAPSULE, EXTENDED RELEASE ORAL DAILY
Qty: 30 CAPSULE | Refills: 0 | Status: SHIPPED | OUTPATIENT
Start: 2019-10-16 | End: 2019-11-15

## 2019-10-16 ASSESSMENT — ANXIETY QUESTIONNAIRES
GAD7 TOTAL SCORE: 9
7. FEELING AFRAID AS IF SOMETHING AWFUL MIGHT HAPPEN: SEVERAL DAYS
IF YOU CHECKED OFF ANY PROBLEMS ON THIS QUESTIONNAIRE, HOW DIFFICULT HAVE THESE PROBLEMS MADE IT FOR YOU TO DO YOUR WORK, TAKE CARE OF THINGS AT HOME, OR GET ALONG WITH OTHER PEOPLE: SOMEWHAT DIFFICULT
3. WORRYING TOO MUCH ABOUT DIFFERENT THINGS: SEVERAL DAYS
2. NOT BEING ABLE TO STOP OR CONTROL WORRYING: MORE THAN HALF THE DAYS
5. BEING SO RESTLESS THAT IT IS HARD TO SIT STILL: SEVERAL DAYS
1. FEELING NERVOUS, ANXIOUS, OR ON EDGE: MORE THAN HALF THE DAYS
6. BECOMING EASILY ANNOYED OR IRRITABLE: SEVERAL DAYS

## 2019-10-16 ASSESSMENT — MIFFLIN-ST. JEOR: SCORE: 1517.52

## 2019-10-16 ASSESSMENT — PATIENT HEALTH QUESTIONNAIRE - PHQ9
5. POOR APPETITE OR OVEREATING: SEVERAL DAYS
SUM OF ALL RESPONSES TO PHQ QUESTIONS 1-9: 2

## 2019-10-16 NOTE — LETTER
[place] [address] [facility] [address]  Phone: [phone]    10/16/19    Kajal Doan  [address]  [address]  [place] [address]      To whom it may concern:     I am Kajal Doan's primary care physician.  Kajal has the following health conditions: ADHD and Generalized anxiety disorder.  For these reasons, I am strongly recommending that she be allowed to have a support person when she is asked to participate in any meetings that might require optimal executive functioning.      If you have any questions or concerns, please do not hesitate to contact me.    Sincerely,            Mela Marr MD

## 2019-10-17 LAB
PROLACTIN SERPL-MCNC: 16 UG/L (ref 3–27)
T4 FREE SERPL-MCNC: 1.05 NG/DL (ref 0.76–1.46)
TSH SERPL DL<=0.005 MIU/L-ACNC: 1.95 MU/L (ref 0.4–4)

## 2019-10-17 ASSESSMENT — ANXIETY QUESTIONNAIRES: GAD7 TOTAL SCORE: 9

## 2019-10-17 ASSESSMENT — ASTHMA QUESTIONNAIRES: ACT_TOTALSCORE: 20

## 2019-10-17 NOTE — PROGRESS NOTES
"Subjective    Kajal Doan is a 18 year old female who presents to clinic today with mother because of:  general health questions and Breast Discharge (x3 weeks on and off)     HPI   Concerns: discharge from her breast x3 weeks on and off and general health questions, depression and anxiety medication.    Discharge is clearish to milky. No blood, and no yellowish or greenish color.  No pain or swelling of surrounding breast tissue.  Is sexually active with her current boyfriend of 8 months. He does stimulate her nipples.  Last STD screen was in June of this year, and was negative.  Declining STD testing today.    Would also like refills for her ADHD medication and her escitalopram (for her YESI).  Has let both prescriptions lapse, but feels like the medications have been helpful in the past.    Psychosocial history:    HOME:  Living in an apartment on [address].  Living with boyfriend.  They pay for the rent from Evette's salary, her boyfriend's earnings and Evette's mother chips in as well. Tries to divide the cleaning (she does the kitchen and the bathroom, and he does the living room and the bedroom).    EDUCATION: Is at [employer].  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  Will try and do two years (generals), and transfer to focus on criminology at another institution (or stay at [employer], she's not sure).    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  Had a family she was working with more steadily, but that family no longer needs a nanny.  Now has to go to different homes for a few days at a time.  Is earning less than she was before.  Her employment asked her to attend an \"employee review\" meeting, and she asked for a \"support person\" to be there due to her ADHD and YESI, and they initially refused.  Mother shares that she has contacted an " "employment , and would like a letter from me saying that she should be allowed to have a \"support person\" with her at all professional meetings for optimal communication.      ACTIVITIES:    Not working or taking classes on the week-ends.  Hangs out at home with her boyfriend, because they don't have the discretionary income to do other things. Need the money they have for food, rent and gas.    DIET:  Knows that she's gained weight, mostly from eating not as healthfully as she did when she was living with her mother.  States she knows what she needs to do.  \"It's just hard\".  I acknowledged that eating healthfully can be challenging, especially when on a budget.  We discussed some healthy options.    DRUGS: Denies alcohol, tobacco or other drug use.    SEXUALITY:  In a relationship with her boyfriend of 8 months.  She denies having outside partners, and states she is not suspicious that he has other partners. Denies dysuria, vaginal discharge, pain with sex, or any lumps/ bumps/ sores in her genital area.  Using Nexplanon for birth control (had it put in at [facility] two years ago).  Feels like it's working well for her.    MOODS: States that while it's hard to have to do her own cleaning and cooking, is happy that she lives with her boyfriend in their own apartment.  Feels like this has allowed her relationship with her mother to be much better.  States that her boyfriend and mother don't see \"eye to eye\", but she and her mother are able to communicate well now, and she is happy to have her mother's support.      Does feel like her anxiety is slightly worse off medication, and would like a refill for her escitalopram.    YESI-7 SCORE 12/8/2016 4/17/2019 10/16/2019   Total Score 4 8 9     PHQ-9 SCORE 4/17/2019 10/16/2019   PHQ-9 Total Score - 2   PHQ-A Total Score 5 -       Review of Systems  GENERAL:  NEGATIVE for fever, poor appetite, and sleep disruption.  SKIN:  NEGATIVE for rash, hives, and " eczema.  EYE:  NEGATIVE for pain, discharge, redness, itching and vision problems.  ENT:  NEGATIVE for ear pain, runny nose, congestion and sore throat.  RESP:  NEGATIVE for cough, wheezing, and difficulty breathing.  CARDIAC:  NEGATIVE for chest pain and cyanosis.   GI:  NEGATIVE for vomiting, diarrhea, abdominal pain and constipation.  :  NEGATIVE for urinary problems.  NEURO:  NEGATIVE for headache and weakness.  ALLERGY:  As in Allergy History  MSK:  NEGATIVE for muscle problems and joint problems.    Problem List  Patient Active Problem List    Diagnosis Date Noted     Housing lack 2019     Priority: Medium     YESI (generalized anxiety disorder) 2016     Priority: Medium     Dysmenorrhea 2016     Priority: Medium     Other secondary hypertension 2016     Priority: Medium     Exercise-induced asthma 2015     Priority: Medium     Deliberate self-cutting 2015     Priority: Medium     Insomnia 2015     Priority: Medium     Proteinuria 2014     Priority: Medium     Needle phobia 2013     Priority: Medium     ADHD (attention deficit hyperactivity disorder) 2010     Priority: Medium      Medications  escitalopram (LEXAPRO) 10 MG tablet, Take 1 tablet (10 mg) by mouth daily  albuterol (PROAIR HFA, PROVENTIL HFA, VENTOLIN HFA) 108 (90 BASE) MCG/ACT inhaler, Take two puffs 15 minutes before any exercise.  [] amphetamine-dextroamphetamine (ADDERALL XR) 20 MG 24 hr capsule, Take 1 capsule (20 mg) by mouth daily  [] amphetamine-dextroamphetamine (ADDERALL XR) 20 MG 24 hr capsule, Take 1 capsule (20 mg) by mouth daily  [] amphetamine-dextroamphetamine (ADDERALL XR) 20 MG 24 hr capsule, Take 1 capsule (20 mg) by mouth daily  amphetamine-dextroamphetamine (ADDERALL XR) 20 MG 24 hr capsule, Take 1 capsule (20 mg) by mouth daily  atenolol (TENORMIN) 50 MG tablet, Take 1 tablet (50 mg) by mouth daily  cholecalciferol (VITAMIN D3) 5000 UNITS CAPS  capsule, Take  by mouth daily.  FISH OIL,   [] fluconazole (DIFLUCAN) 150 MG tablet, Take 1 tablet (150 mg) by mouth once for 1 dose Take 1 tablet x 1. May repeat dose in 72 hours IF persistent symptoms.  FOLIC ACID PO, Take 1 mg by mouth daily.  hydrocortisone 2.5 % cream, Apply to affected areas around nose daily for 3-4 days, then stop.  [] miconazole (MICATIN) 2 % external cream, Apply topically 2 times daily for 5 days    No current facility-administered medications on file prior to visit.     Allergies  No Known Allergies  Reviewed and updated as needed this visit by Provider           Objective    There were no vitals taken for this visit.  No weight on file for this encounter.    Physical Exam  GENERAL: Active, alert, in no acute distress.  SKIN: Clear. No significant rash, abnormal pigmentation or lesions  HEAD: Normocephalic.  EYES:  No discharge or erythema. Normal pupils and EOM.  EARS: Normal canals. Tympanic membranes are normal; gray and translucent.  NOSE: Normal without discharge.  MOUTH/THROAT: Clear. No oral lesions. Teeth intact without obvious abnormalities.  NECK: Supple, no masses.  LYMPH NODES: No adenopathy  BREAST: unable to express discharge.  Normal exam.  No evidence of infection.  LUNGS: Clear. No rales, rhonchi, wheezing or retractions  HEART: Regular rhythm. Normal S1/S2. No murmurs.  ABDOMEN: Soft, non-tender, not distended, no masses or hepatosplenomegaly. Bowel sounds normal.     Diagnostics: None      Assessment & Plan    1. Galactorrhea  Nature of discharge is not concerning for infection or other inflammatory state. Will sent the following labs to rule out underlying pathology as a cause of her galactorrhea:    - Prolactin  - TSH  - T4, free  - HCG Qual, Urine: negative in clinic    If all tests are negative, will refer to Gyn for further evaluation and management.    2. Attention deficit hyperactivity disorder (ADHD), combined type  Have also discussed with Evette  and her mother the value of getting a formal neuropsychological evaluation so that she will have good documentation for any future needs for special accommodations with work or educational pursuits.  Both Evette and her mother agree with this suggestion.    - amphetamine-dextroamphetamine (ADDERALL XR) 20 MG 24 hr capsule; Take 1 capsule (20 mg) by mouth daily  Dispense: 30 capsule; Refill: 0  - amphetamine-dextroamphetamine (ADDERALL XR) 20 MG 24 hr capsule; Take 1 capsule (20 mg) by mouth daily  Dispense: 30 capsule; Refill: 0  - amphetamine-dextroamphetamine (ADDERALL XR) 20 MG 24 hr capsule; Take 1 capsule (20 mg) by mouth daily  Dispense: 30 capsule; Refill: 0    - NEUROPSYCHOLOGY REFERRAL (ADULT)    3. YESI (generalized anxiety disorder)    - escitalopram (LEXAPRO) 10 MG tablet; Take 1 tablet (10 mg) by mouth daily  Dispense: 30 tablet; Refill: 3  - NEUROPSYCHOLOGY REFERRAL    4. Need for prophylactic vaccination and inoculation against influenza    - INFLUENZA VACCINE IM > 6 MONTHS VALENT IIV4 [35609]  - Vaccine Administration, Initial [37771]    Follow Up    In 3 months for mood and medication check.  Spent over 50% in face-to-face health counseling.  Total visit time: 40 minutes.        Mela Marr MD

## 2019-11-14 ENCOUNTER — TELEPHONE (OUTPATIENT)
Dept: PEDIATRICS | Facility: CLINIC | Age: 19
End: 2019-11-14

## 2019-11-14 NOTE — TELEPHONE ENCOUNTER
Mother reports long-standing GI issues related to stress. Hospitalized last march for pancolitis. Was uncomfortable the last few days but comes and goes. Denies fever, NV. Intermittent pain, feels it is unrelated to menstrual cycle.    Discussed when to be seen sooner, parent prefers appt with pcp.  Ok to try tylenol or Tums.     Did not disclose any PHI to parent.    Milla Owens RN

## 2019-11-14 NOTE — TELEPHONE ENCOUNTER
Reason for call:  Other   Patient called regarding (reason for call): call back  Additional comments: Patient mother would like to have some advice as to how to best help the patient in relation to colitis symptoms, abdominal discomfort. Please advise.     Phone number to reach patient:  Home number on file [phone] (home)    Best Time:  As soon as possible    Can we leave a detailed message on this number?  YES

## 2019-11-21 ENCOUNTER — TELEPHONE (OUTPATIENT)
Dept: PEDIATRICS | Facility: CLINIC | Age: 19
End: 2019-11-21

## 2019-11-21 NOTE — TELEPHONE ENCOUNTER
I called and spoke with mother.  Evette, despite her age, is welcoming of her mother's help in communication with me and arranging for appointments.  I conveyed that Evette's work-up for breast discharge was negative, and that I'd like her to follow up with a Gyn (mother will make that appointment).  In addition, I gave mother the number for adult neuropsychology for an evaluation (from the referral I made back in October).  Finally, I asked that Evette come back to see me after the holidays to discuss GI issues and possible benefit of diet modification.  Mother agreed with plan.

## 2019-11-21 NOTE — TELEPHONE ENCOUNTER
E-mail received by me from mother from 11/20/19:    Warm greetings, Mela, I hope this message finds you well. 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    She had asked for an appointment which I scheduled and then cancelled after speaking with her this week. Instead of coming to the clinic, what she would really like is a couple of pieces of information from you:  Feedback on the drainage that was coming from her breasts....she said that there was some followup (to a test?), and while it is not happening now, she still would like to understand more.  She would like your guidance on tummy challenges, and I reminded her that your first line of action would be to do some elimination of foods, especially gluten and dairy. Becoming intimate with what works and what doesn't work with regarding to digestion is a lifelong journey, and it would be so good if that journey could start at this age, rather than endure years of pain. Your comments at this juncture would be helpful again....maybe a food plan (e.g., Keto diet) that would accomplish the same thing as the elimination approach.  She asked me to schedule the psychological testing at the [facility], and I apologize that I cannot recall where to access the phone number to make an appointment.  And are your orders for the evaluation in the system so they will have the information from you that is needed to make the appointment?  Evette is experiencing fairly frequent, stress-generated headaches, so she is motivated to have the results as a source of insights into her learning style, processing obstacles, and possibly the headaches.  I would appreciate this information.  Thanks so much for this support and your response to Evette (see her email above).  I suspect that communicating electronically is best through [facility] yet I am loathe to enter another  system.  Let me know if this process is acceptable to you.    Kiara Doan

## 2020-01-09 ENCOUNTER — OFFICE VISIT (OUTPATIENT)
Dept: PEDIATRICS | Facility: CLINIC | Age: 20
End: 2020-01-09
Payer: COMMERCIAL

## 2020-01-09 VITALS — WEIGHT: 195.4 LBS | BODY MASS INDEX: 39.39 KG/M2 | TEMPERATURE: 98.4 F | HEIGHT: 59 IN

## 2020-01-09 DIAGNOSIS — N76.0 BACTERIAL VAGINOSIS: ICD-10-CM

## 2020-01-09 DIAGNOSIS — B37.31 YEAST INFECTION OF THE VAGINA: ICD-10-CM

## 2020-01-09 DIAGNOSIS — B96.89 BACTERIAL VAGINOSIS: ICD-10-CM

## 2020-01-09 DIAGNOSIS — N89.8 VAGINAL DISCHARGE: Primary | ICD-10-CM

## 2020-01-09 LAB
SPECIMEN SOURCE: ABNORMAL
WET PREP SPEC: ABNORMAL

## 2020-01-09 PROCEDURE — 99213 OFFICE O/P EST LOW 20 MIN: CPT | Performed by: PEDIATRICS

## 2020-01-09 PROCEDURE — 87591 N.GONORRHOEAE DNA AMP PROB: CPT | Performed by: PEDIATRICS

## 2020-01-09 PROCEDURE — 87210 SMEAR WET MOUNT SALINE/INK: CPT | Performed by: PEDIATRICS

## 2020-01-09 PROCEDURE — 87491 CHLMYD TRACH DNA AMP PROBE: CPT | Performed by: PEDIATRICS

## 2020-01-09 RX ORDER — METRONIDAZOLE 500 MG/1
500 TABLET ORAL 2 TIMES DAILY
Qty: 14 TABLET | Refills: 0 | Status: SHIPPED | OUTPATIENT
Start: 2020-01-09 | End: 2020-01-16

## 2020-01-09 RX ORDER — FLUCONAZOLE 150 MG/1
150 TABLET ORAL ONCE
Qty: 1 TABLET | Refills: 0 | Status: SHIPPED | OUTPATIENT
Start: 2020-01-09 | End: 2020-10-13

## 2020-01-09 ASSESSMENT — MIFFLIN-ST. JEOR: SCORE: 1555.95

## 2020-01-10 NOTE — PROGRESS NOTES
Joelle Doan is a 19 year old female who presents to clinic today with Herself  because of:  Vaginal Problem and Health Maintenance (PHQ-2)     HPI     URINARY    Problem started: 4 days ago  Painful urination: no  Blood in urine: no  Frequent urination: no  Daytime/Nightime wetting: no   Fever: no  Any vaginal symptoms: vaginal discharge and vaginal itching  Abdominal Pain: no  Therapies tried: None  History of UTI or bladder infection: YES  Sexually Active: YES      Has a history of 1-2 yeast infections in the past and a urinary tract infection earlier this year  Is sexually active with one partner - has been exclusive with this partner for about a year                Review of Systems  Constitutional, eye, ENT, skin, respiratory, cardiac, and GI are normal except as otherwise noted.    Problem List  Patient Active Problem List    Diagnosis Date Noted     Housing lack 04/18/2019     Priority: Medium     YESI (generalized anxiety disorder) 12/06/2016     Priority: Medium     Dysmenorrhea 07/26/2016     Priority: Medium     Other secondary hypertension 07/20/2016     Priority: Medium     Exercise-induced asthma 11/24/2015     Priority: Medium     Deliberate self-cutting 08/04/2015     Priority: Medium     Insomnia 08/04/2015     Priority: Medium     Proteinuria 08/25/2014     Priority: Medium     Needle phobia 07/16/2013     Priority: Medium     ADHD (attention deficit hyperactivity disorder) 07/07/2010     Priority: Medium      Medications  albuterol (PROAIR HFA, PROVENTIL HFA, VENTOLIN HFA) 108 (90 BASE) MCG/ACT inhaler, Take two puffs 15 minutes before any exercise.  amphetamine-dextroamphetamine (ADDERALL XR) 20 MG 24 hr capsule, Take 1 capsule (20 mg) by mouth daily  amphetamine-dextroamphetamine (ADDERALL XR) 20 MG 24 hr capsule, Take 1 capsule (20 mg) by mouth daily  atenolol (TENORMIN) 50 MG tablet, Take 1 tablet (50 mg) by mouth daily  cholecalciferol (VITAMIN D3) 5000 UNITS CAPS capsule, Take   "by mouth daily.  FISH OIL,   FOLIC ACID PO, Take 1 mg by mouth daily.  hydrocortisone 2.5 % cream, Apply to affected areas around nose daily for 3-4 days, then stop.  [] amphetamine-dextroamphetamine (ADDERALL XR) 20 MG 24 hr capsule, Take 1 capsule (20 mg) by mouth daily  [] amphetamine-dextroamphetamine (ADDERALL XR) 20 MG 24 hr capsule, Take 1 capsule (20 mg) by mouth daily  [] amphetamine-dextroamphetamine (ADDERALL XR) 20 MG 24 hr capsule, Take 1 capsule (20 mg) by mouth daily  escitalopram (LEXAPRO) 10 MG tablet, Take 1 tablet (10 mg) by mouth daily    No current facility-administered medications on file prior to visit.     Allergies  No Known Allergies  Reviewed and updated as needed this visit by Provider           Objective    Temp 98.4  F (36.9  C) (Oral)   Ht 4' 10.62\" (1.489 m)   Wt 195 lb 6.4 oz (88.6 kg)   LMP 2019   BMI 39.98 kg/m    Normalized weight-for-age data not available for patients older than 20 years.    Physical Exam  GENERAL: Active, alert, in no acute distress.  SKIN: Clear. No significant rash, abnormal pigmentation or lesions  HEENT:  No eye erythema or discharge, Mucous membranes moist, nose clear  ABDOMEN: Soft, non-tender, not distended, no masses or hepatosplenomegaly. Bowel sounds normal.     Diagnostics:   Results for orders placed or performed in visit on 20   Wet prep     Status: Abnormal   Result Value Ref Range    Specimen Description Vagina     Wet Prep No Trichomonas seen     Wet Prep Many  Clue cells seen   (A)     Wet Prep Moderate  Yeast seen   (A)     Wet Prep Moderate  WBC'S seen      Neisseria gonorrhoeae PCR     Status: None   Result Value Ref Range    Specimen Descrip Cervix     N Gonorrhea PCR Negative NEG^Negative   Chlamydia trachomatis PCR     Status: None   Result Value Ref Range    Specimen Description Cervix     Chlamydia Trachomatis PCR Negative NEG^Negative           Assessment & Plan    1. Vaginal discharge    - Wet " prep  - Neisseria gonorrhoeae PCR  - Chlamydia trachomatis PCR    2. Yeast infection of the vagina    - fluconazole (DIFLUCAN) 150 MG tablet; Take 1 tablet (150 mg) by mouth once for 1 dose  Dispense: 1 tablet; Refill: 0    3. Bacterial vaginosis  Discussed that she cannot drink alcohol while taking this medication   - metroNIDAZOLE (FLAGYL) 500 MG tablet; Take 1 tablet (500 mg) by mouth 2 times daily for 7 days  Dispense: 14 tablet; Refill: 0    Follow Up  No follow-ups on file.  If not improving or if worsening    Melvina Randhawa MD

## 2020-03-11 ENCOUNTER — OFFICE VISIT (OUTPATIENT)
Dept: URGENT CARE | Facility: URGENT CARE | Age: 20
End: 2020-03-11
Payer: COMMERCIAL

## 2020-03-11 VITALS
BODY MASS INDEX: 38.83 KG/M2 | WEIGHT: 185 LBS | OXYGEN SATURATION: 98 % | HEIGHT: 58 IN | TEMPERATURE: 98.3 F | HEART RATE: 68 BPM | DIASTOLIC BLOOD PRESSURE: 88 MMHG | SYSTOLIC BLOOD PRESSURE: 132 MMHG

## 2020-03-11 DIAGNOSIS — H61.21 IMPACTED CERUMEN OF RIGHT EAR: Primary | ICD-10-CM

## 2020-03-11 PROCEDURE — 69209 REMOVE IMPACTED EAR WAX UNI: CPT | Mod: RT | Performed by: INTERNAL MEDICINE

## 2020-03-11 PROCEDURE — 99212 OFFICE O/P EST SF 10 MIN: CPT | Mod: 25 | Performed by: INTERNAL MEDICINE

## 2020-03-11 ASSESSMENT — MIFFLIN-ST. JEOR: SCORE: 1498.9

## 2020-03-12 NOTE — PROGRESS NOTES
"SUBJECTIVE:   Kajal Doan is a 19 year old female presenting with a chief complaint of   Chief Complaint   Patient presents with     Urgent Care     Ear Problem     c/o plugged ears for 2 days       She is an established patient of [facility].        Onset of symptoms was 2 day(s) ago.    Current and Associated symptoms: ear wax symptoms     Treatment measures tried include tried to remove with qtip.  Predisposing factors include wax.        Review of Systems   HENT: Positive for ear pain and hearing loss.         Right side       Past Medical History:   Diagnosis Date     Pediatric hypertension      Family History   Problem Relation Age of Onset     Diabetes Mother      Current Outpatient Medications   Medication Sig Dispense Refill     albuterol (PROAIR HFA, PROVENTIL HFA, VENTOLIN HFA) 108 (90 BASE) MCG/ACT inhaler Take two puffs 15 minutes before any exercise. 1 Inhaler 1     amphetamine-dextroamphetamine (ADDERALL XR) 20 MG 24 hr capsule Take 1 capsule (20 mg) by mouth daily 30 capsule 0     atenolol (TENORMIN) 50 MG tablet Take 1 tablet (50 mg) by mouth daily 30 tablet 11     cholecalciferol (VITAMIN D3) 5000 UNITS CAPS capsule Take  by mouth daily.       FISH OIL        FOLIC ACID PO Take 1 mg by mouth daily.       hydrocortisone 2.5 % cream Apply to affected areas around nose daily for 3-4 days, then stop. 30 g 3     escitalopram (LEXAPRO) 10 MG tablet Take 1 tablet (10 mg) by mouth daily 30 tablet 3     Social History     Tobacco Use     Smoking status: Never Smoker     Smokeless tobacco: Never Used     Tobacco comment: non smoking home   Substance Use Topics     Alcohol use: No       OBJECTIVE  /88   Pulse 68   Temp 98.3  F (36.8  C) (Tympanic)   Ht 1.473 m (4' 10\")   Wt 83.9 kg (185 lb)   SpO2 98%   BMI 38.67 kg/m      Physical Exam  Vitals signs reviewed.   HENT:      Right Ear: There is impacted cerumen.      Left Ear: Tympanic membrane normal.     nurse irrigation  Reexam shows successful " removal of ear wax by irrigation.  Right tympanic membrane is normal  Labs:  No results found for this or any previous visit (from the past 24 hour(s)).        ASSESSMENT:      ICD-10-CM    1. Impacted cerumen of right ear  H61.21 HC REMOVAL IMPACTED CERUMEN IRRIGATION/LVG UNILAT        Patient Instructions       Patient Education     Impacted Earwax     Inner ear structures including ear canal and eardrum.     Impacted earwax is a buildup of the natural wax in the ear (cerumen). Impacted earwax is very common. It can cause symptoms such as hearing loss. It can also make it difficult for a doctor to examine your ear.  Understanding earwax  Tiny glands in your ear make substances that combine with dead skin cells to form earwax. Earwax helps protect your ear canal from water, dirt, infection, and injury. Over time, earwax travels from the inner part of your ear canal to the entrance of the canal. Then it falls away naturally. But in some cases, it can t travel to the entrance of the canal. This may be because of a health condition or objects put in the ear. With age, earwax tends to become harder and less fluid. Older adults are more likely to have problems with earwax buildup.  What causes impacted earwax?  Earwax can build up because of many health conditions. Some cause a physical blockage. Others cause too much earwax to be made. Health conditions that can cause earwax buildup include:    Use of cotton swabs to clean deep in the ear canal    Bony blockage in the ear (osteoma or exostoses)    Infections, such as  infection of the outer ear (external otitis)    Skin disease, such as eczema    Autoimmune diseases, such as lupus    A narrowed ear canal from birth, chronic inflammation, or injury    Too much earwax because of injury    Too much earwax because of  water in the ear canal  Objects repeatedly placed in the ear can also cause impacted earwax. For example, putting cotton swabs in the ear may push the wax  deeper into the ear. Over time, this may cause blockage. Hearing aids, swimming plugs, and swim molds can cause the same problem when used again and again.  In some cases, the cause of impacted earwax is not known.  Symptoms of impacted earwax  Excess earwax usually does not cause any symptoms, unless there is a large amount of buildup. Then it may cause symptoms such as:    Hearing loss    Earache    Sense of ear fullness    Itching in the ear    Odor from the ear    Ear drainage    Dizziness    Ringing in the ears    Cough  Treatment for impacted earwax  If you don t have symptoms, you may not need treatment. Often, the earwax goes away on its own with time. If you have symptoms, you may have one or more treatments such as:    Eardrops to soften the earwax. This helps it leave the ear over time.    Rinsing (irrigation) of the ear canal with water. This is done in a doctor s office.    Removal of the earwax with small tools. This is also done in a doctor s office.  In rare cases, some treatments for earwax removal may cause complications such as:    Infection of the outer ear (otitis external)    Earache    Short-term hearing loss    Dizziness    Water trapped in the ear canal    Hole in the eardrum    Ringing in the ears    Bleeding from the ear  Talk with your healthcare provider about which risks apply most to you.  Healthcare providers do not advise use of ear candles or ear vacuum kits. These methods are not shown to work and may cause problems.  Preventing impacted earwax  You may not be able to prevent impacted earwax if you have a health condition that causes it, such as eczema. In other cases, you may be able to prevent earwax buildup by:    Using ear drops once a week    Having routine cleaning of the ear about every 6 months    Not using cotton swabs in the ear  Call your healthcare provider if you have symptoms of impacted earwax. Also call right away if you have severe symptoms after earwax removal.  These may include bleeding or severe ear pain.  Date Last Reviewed: 5/1/2017 2000-2019 [employer]. [address] [place] [address], [place] [address]. All rights reserved. This information is not intended as a substitute for professional medical care. Always follow your healthcare professional's instructions.

## 2020-03-12 NOTE — PATIENT INSTRUCTIONS
Patient Education     Impacted Earwax     Inner ear structures including ear canal and eardrum.     Impacted earwax is a buildup of the natural wax in the ear (cerumen). Impacted earwax is very common. It can cause symptoms such as hearing loss. It can also make it difficult for a doctor to examine your ear.  Understanding earwax  Tiny glands in your ear make substances that combine with dead skin cells to form earwax. Earwax helps protect your ear canal from water, dirt, infection, and injury. Over time, earwax travels from the inner part of your ear canal to the entrance of the canal. Then it falls away naturally. But in some cases, it can t travel to the entrance of the canal. This may be because of a health condition or objects put in the ear. With age, earwax tends to become harder and less fluid. Older adults are more likely to have problems with earwax buildup.  What causes impacted earwax?  Earwax can build up because of many health conditions. Some cause a physical blockage. Others cause too much earwax to be made. Health conditions that can cause earwax buildup include:    Use of cotton swabs to clean deep in the ear canal    Bony blockage in the ear (osteoma or exostoses)    Infections, such as  infection of the outer ear (external otitis)    Skin disease, such as eczema    Autoimmune diseases, such as lupus    A narrowed ear canal from birth, chronic inflammation, or injury    Too much earwax because of injury    Too much earwax because of  water in the ear canal  Objects repeatedly placed in the ear can also cause impacted earwax. For example, putting cotton swabs in the ear may push the wax deeper into the ear. Over time, this may cause blockage. Hearing aids, swimming plugs, and swim molds can cause the same problem when used again and again.  In some cases, the cause of impacted earwax is not known.  Symptoms of impacted earwax  Excess earwax usually does not cause any symptoms, unless there is a  large amount of buildup. Then it may cause symptoms such as:    Hearing loss    Earache    Sense of ear fullness    Itching in the ear    Odor from the ear    Ear drainage    Dizziness    Ringing in the ears    Cough  Treatment for impacted earwax  If you don t have symptoms, you may not need treatment. Often, the earwax goes away on its own with time. If you have symptoms, you may have one or more treatments such as:    Eardrops to soften the earwax. This helps it leave the ear over time.    Rinsing (irrigation) of the ear canal with water. This is done in a doctor s office.    Removal of the earwax with small tools. This is also done in a doctor s office.  In rare cases, some treatments for earwax removal may cause complications such as:    Infection of the outer ear (otitis external)    Earache    Short-term hearing loss    Dizziness    Water trapped in the ear canal    Hole in the eardrum    Ringing in the ears    Bleeding from the ear  Talk with your healthcare provider about which risks apply most to you.  Healthcare providers do not advise use of ear candles or ear vacuum kits. These methods are not shown to work and may cause problems.  Preventing impacted earwax  You may not be able to prevent impacted earwax if you have a health condition that causes it, such as eczema. In other cases, you may be able to prevent earwax buildup by:    Using ear drops once a week    Having routine cleaning of the ear about every 6 months    Not using cotton swabs in the ear  Call your healthcare provider if you have symptoms of impacted earwax. Also call right away if you have severe symptoms after earwax removal. These may include bleeding or severe ear pain.  Date Last Reviewed: 5/1/2017 2000-2019 [employer]. [address] [place] [address], [place] [address]. All rights reserved. This information is not intended as a substitute for professional medical care. Always follow your healthcare professional's  instructions.

## 2020-06-21 ENCOUNTER — TELEPHONE (OUTPATIENT)
Dept: PEDIATRICS | Facility: CLINIC | Age: 20
End: 2020-06-21

## 2020-06-21 DIAGNOSIS — I10 BENIGN ESSENTIAL HYPERTENSION: ICD-10-CM

## 2020-06-21 NOTE — TELEPHONE ENCOUNTER
Reason for call:  Medication   If this is a refill request, has the caller requested the refill from the pharmacy already? Yes  Will the patient be using a [facility]? No  Name of the pharmacy and phone number for the current request: [facility] on [address]    Name of the medication requested: Attenolol    Other request: Patient is hoping to see Dr. Marr for an annual physical if possible as well, sometime in July. Pt is 19 years old.    Phone number to reach patient:  Other phone number:  [phone]    Best Time:  Asap    Can we leave a detailed message on this number?  YES    Travel screening: Not Applicable

## 2020-06-22 NOTE — TELEPHONE ENCOUNTER
Mom states Evette was into dentist and dentist did not complete cleaning due to her blood pressure being so high. Mom states Evette has not been taking her Atenolol. Mom unsure what reading was. Mom states would like to get a refill of Atenolol. Will make a follow up appointment because Evette will need a physical and preop to have wisdom teeth removed. Mom wanting to know if Evette should start back on Atenolol and wait 1 month to come back in to recheck? Or should wait longer?   Routing to  to review.    Evette works everyday during week from 9-5:30pm so makes it hard to come into clinic.    Joanne Esteban RN

## 2020-06-22 NOTE — TELEPHONE ENCOUNTER
Per last OV on 10/16/2019:  Assessment & Plan     1. Galactorrhea  Nature of discharge is not concerning for infection or other inflammatory state. Will sent the following labs to rule out underlying pathology as a cause of her galactorrhea:     - Prolactin  - TSH  - T4, free  - HCG Qual, Urine: negative in clinic     If all tests are negative, will refer to Gyn for further evaluation and management.     2. Attention deficit hyperactivity disorder (ADHD), combined type  Have also discussed with Evette and her mother the value of getting a formal neuropsychological evaluation so that she will have good documentation for any future needs for special accommodations with work or educational pursuits.  Both Evette and her mother agree with this suggestion.     - amphetamine-dextroamphetamine (ADDERALL XR) 20 MG 24 hr capsule; Take 1 capsule (20 mg) by mouth daily  Dispense: 30 capsule; Refill: 0  - amphetamine-dextroamphetamine (ADDERALL XR) 20 MG 24 hr capsule; Take 1 capsule (20 mg) by mouth daily  Dispense: 30 capsule; Refill: 0  - amphetamine-dextroamphetamine (ADDERALL XR) 20 MG 24 hr capsule; Take 1 capsule (20 mg) by mouth daily  Dispense: 30 capsule; Refill: 0     - NEUROPSYCHOLOGY REFERRAL (ADULT)     3. YESI (generalized anxiety disorder)     - escitalopram (LEXAPRO) 10 MG tablet; Take 1 tablet (10 mg) by mouth daily  Dispense: 30 tablet; Refill: 3  - NEUROPSYCHOLOGY REFERRAL     4. Need for prophylactic vaccination and inoculation against influenza     - INFLUENZA VACCINE IM > 6 MONTHS VALENT IIV4 [88715]  - Vaccine Administration, Initial [14855]     Follow Up     In 3 months for mood and medication check.  Spent over 50% in face-to-face health counseling.  Total visit time: 40 minutes.     Mela Marr MD

## 2020-06-23 RX ORDER — ATENOLOL 50 MG/1
50 TABLET ORAL DAILY
Qty: 30 TABLET | Refills: 0 | Status: SHIPPED | OUTPATIENT
Start: 2020-06-23 | End: 2020-07-23

## 2020-06-23 NOTE — TELEPHONE ENCOUNTER
I called and spoke with Evette's mother.  She has not taken her Atenolol for several months now.  I agreed to refill her prescription for one more month, and urged mother to have her establish care with an Internist to evaluation and manage her hypertension.  We discussed Evette transferring her care to [facility] or even [facility] [place]. Mother agreed with plan.

## 2020-10-13 DIAGNOSIS — B37.31 YEAST INFECTION OF THE VAGINA: ICD-10-CM

## 2020-10-13 RX ORDER — FLUCONAZOLE 150 MG/1
TABLET ORAL
Qty: 1 TABLET | Refills: 0 | Status: SHIPPED | OUTPATIENT
Start: 2020-10-13

## 2020-10-13 NOTE — TELEPHONE ENCOUNTER
"Requested Prescriptions   Pending Prescriptions Disp Refills     fluconazole (DIFLUCAN) 150 MG tablet [Pharmacy Med Name: FLUCONAZOLE 150MG TABLETS] 1 tablet 0     Sig: TAKE 1 TABLET BY MOUTH ONCE FOR 1 DOSE       Antifungal Agents Failed - 10/13/2020  2:10 PM        Failed - Not Fluconazole or Terconazole      If oral Fluconazole or Terconazole, may refill if indicated in progress notes.           Failed - Medication is active on med list        Passed - Recent (12 mo) or future (30 days) visit within the authorizing provider's specialty     Patient has had an office visit with the authorizing provider or a provider within the authorizing providers department within the previous 12 mos or has a future within next 30 days. See \"Patient Info\" tab in inbasket, or \"Choose Columns\" in Meds & Orders section of the refill encounter.                   "

## 2022-03-01 NOTE — NURSING NOTE
"[facility] [[id number]]  Chief Complaint   Patient presents with     RECHECK     Proteinuria, HTN     Initial /90 (BP Location: Right arm, Patient Position: Sitting, Cuff Size: Adult Regular)  Pulse (!) 48  Ht 4' 11.25\" (150.5 cm)  Wt 145 lb 1 oz (65.8 kg)  BMI 29.05 kg/m2 Estimated body mass index is 29.05 kg/(m^2) as calculated from the following:    Height as of this encounter: 4' 11.25\" (150.5 cm).    Weight as of this encounter: 145 lb 1 oz (65.8 kg).  Medication Reconciliation: complete Casie Naqvi LPN  /90 (BP Location: Right arm, Patient Position: Sitting, Cuff Size: Adult Regular)  Pulse (!) 48  Ht 4' 11.25\" (150.5 cm)  Wt 145 lb 1 oz (65.8 kg)  BMI 29.05 kg/m2  Right Arm Used? yes  Measured Right Arm Circumference (in cms): 26.3cm  Did you measure at the largest part of upper arm? yes  Peds BP Cuff Size Used Medium adult (23-33 cm)  Activity/Barriers:  none      " Admission

## 2025-03-09 ENCOUNTER — APPOINTMENT (OUTPATIENT)
Dept: CT IMAGING | Facility: CLINIC | Age: 25
End: 2025-03-09
Attending: STUDENT IN AN ORGANIZED HEALTH CARE EDUCATION/TRAINING PROGRAM
Payer: COMMERCIAL

## 2025-03-09 ENCOUNTER — HOSPITAL ENCOUNTER (EMERGENCY)
Facility: CLINIC | Age: 25
Discharge: HOME | End: 2025-03-09
Attending: STUDENT IN AN ORGANIZED HEALTH CARE EDUCATION/TRAINING PROGRAM | Admitting: STUDENT IN AN ORGANIZED HEALTH CARE EDUCATION/TRAINING PROGRAM
Payer: COMMERCIAL

## 2025-03-09 VITALS
DIASTOLIC BLOOD PRESSURE: 98 MMHG | SYSTOLIC BLOOD PRESSURE: 183 MMHG | BODY MASS INDEX: 55.66 KG/M2 | OXYGEN SATURATION: 94 % | RESPIRATION RATE: 22 BRPM | HEART RATE: 92 BPM | TEMPERATURE: 97.5 F | WEIGHT: 266.3 LBS

## 2025-03-09 DIAGNOSIS — F10.920 ALCOHOLIC INTOXICATION WITHOUT COMPLICATION: ICD-10-CM

## 2025-03-09 LAB
AMPHETAMINES UR QL SCN: ABNORMAL
BARBITURATES UR QL SCN: ABNORMAL
BENZODIAZ UR QL SCN: ABNORMAL
BZE UR QL SCN: ABNORMAL
CANNABINOIDS UR QL SCN: ABNORMAL
FENTANYL UR QL: ABNORMAL
GLUCOSE BLDC GLUCOMTR-MCNC: 97 MG/DL (ref 70–99)
HCG UR QL: NEGATIVE
OPIATES UR QL SCN: ABNORMAL
PCP QUAL URINE (ROCHE): ABNORMAL

## 2025-03-09 PROCEDURE — 80307 DRUG TEST PRSMV CHEM ANLYZR: CPT | Performed by: STUDENT IN AN ORGANIZED HEALTH CARE EDUCATION/TRAINING PROGRAM

## 2025-03-09 PROCEDURE — 99285 EMERGENCY DEPT VISIT HI MDM: CPT | Mod: 25

## 2025-03-09 PROCEDURE — 82962 GLUCOSE BLOOD TEST: CPT

## 2025-03-09 PROCEDURE — 250N000011 HC RX IP 250 OP 636: Performed by: STUDENT IN AN ORGANIZED HEALTH CARE EDUCATION/TRAINING PROGRAM

## 2025-03-09 PROCEDURE — 70450 CT HEAD/BRAIN W/O DYE: CPT

## 2025-03-09 PROCEDURE — 96372 THER/PROPH/DIAG INJ SC/IM: CPT | Performed by: STUDENT IN AN ORGANIZED HEALTH CARE EDUCATION/TRAINING PROGRAM

## 2025-03-09 PROCEDURE — 81025 URINE PREGNANCY TEST: CPT | Performed by: STUDENT IN AN ORGANIZED HEALTH CARE EDUCATION/TRAINING PROGRAM

## 2025-03-09 RX ORDER — HALOPERIDOL 5 MG/ML
2 INJECTION INTRAMUSCULAR ONCE
Status: COMPLETED | OUTPATIENT
Start: 2025-03-09 | End: 2025-03-09

## 2025-03-09 RX ORDER — HALOPERIDOL 5 MG/ML
3 INJECTION INTRAMUSCULAR ONCE
Status: COMPLETED | OUTPATIENT
Start: 2025-03-09 | End: 2025-03-09

## 2025-03-09 RX ORDER — DIPHENHYDRAMINE HYDROCHLORIDE 50 MG/ML
50 INJECTION, SOLUTION INTRAMUSCULAR; INTRAVENOUS ONCE
Status: COMPLETED | OUTPATIENT
Start: 2025-03-09 | End: 2025-03-09

## 2025-03-09 RX ORDER — LORAZEPAM 2 MG/ML
1 INJECTION INTRAMUSCULAR ONCE
Status: COMPLETED | OUTPATIENT
Start: 2025-03-09 | End: 2025-03-09

## 2025-03-09 RX ORDER — LORAZEPAM 2 MG/ML
2 INJECTION INTRAMUSCULAR ONCE
Status: COMPLETED | OUTPATIENT
Start: 2025-03-09 | End: 2025-03-09

## 2025-03-09 RX ADMIN — LORAZEPAM 1 MG: 2 INJECTION INTRAMUSCULAR; INTRAVENOUS at 07:02

## 2025-03-09 RX ADMIN — DIPHENHYDRAMINE HYDROCHLORIDE 50 MG: 50 INJECTION, SOLUTION INTRAMUSCULAR; INTRAVENOUS at 06:19

## 2025-03-09 RX ADMIN — HALOPERIDOL LACTATE 2 MG: 5 INJECTION, SOLUTION INTRAMUSCULAR at 05:19

## 2025-03-09 RX ADMIN — LORAZEPAM 2 MG: 2 INJECTION INTRAMUSCULAR; INTRAVENOUS at 05:30

## 2025-03-09 RX ADMIN — HALOPERIDOL LACTATE 3 MG: 5 INJECTION, SOLUTION INTRAMUSCULAR at 04:48

## 2025-03-09 ASSESSMENT — ACTIVITIES OF DAILY LIVING (ADL)
ADLS_ACUITY_SCORE: 41

## 2025-03-09 ASSESSMENT — COLUMBIA-SUICIDE SEVERITY RATING SCALE - C-SSRS: IS THE PATIENT NOT ABLE TO COMPLETE C-SSRS: UNABLE TO VERBALIZE

## 2025-03-09 NOTE — ED NOTES
Patient discharged home with friend.  Patient aware of sleep apnea and is working on getting a CPAP machine.  Per off going nurse MD aware of sleep apnea and high blood pressure.

## 2025-03-09 NOTE — ED NOTES
Pt in and out of sleep, woke up momentarily asking to use the bathroom. Pt now on bedpan but is sleeping again.

## 2025-03-09 NOTE — ED NOTES
Pt screaming out, words that don't make sense.  Pt swinging at staff.  IM haldol given.  MD at bedside.
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
## 2025-03-09 NOTE — ED NOTES
Pt in 4 point restraints. See MAR for medications administered.    Pt still agitated, yelling, and, trying to get out of restraints by biting it.     1:1  remains at bedside.

## 2025-03-09 NOTE — ED NOTES
Nasal trumpet placed in left nare with no difficulty.  Pt kept it in briefly and then pulled it out.

## 2025-03-09 NOTE — ED NOTES
Rounded on patient. Patient again removing oxymask and needing reminder that oxymask needs to stay on for her sats. Also instructed pt that we need her to not bite at restraints. Patient again requesting to have restraints off. Informed pt that we also want the restraints off when it is safe for her and staff. Instructed pt that we need to see that she can cooperate with us and not harm herself. Instructed pt that if she can show us she means what she says by not biting anymore at restraints and not pulling off oxymask for 20 minutes that we can see if restraints can be removed. Patient states agreement and then noted to try to take oxymask off again. Reminded we need the oxymask on to keep her sats up.

## 2025-03-09 NOTE — ED NOTES
Patient sleeping in room. Per 1:1 sitter has not been impulsively getting out of bed anymore. Bed alarm placed under patient and 1:1 sitter discontinued.

## 2025-03-09 NOTE — ED NOTES
Family friend [place] stopped by to see patient. Patient very sleepy and not arousing to repeated verbal stimulation. Respiratory rate regular and deep. Pulse ox staying above 95% on room air without supplemental o2. Maine states she will leave for now and will be available by phone when pt more awake and is planning on being pt's ride for discharge. Can be reached at [phone].

## 2025-03-09 NOTE — ED NOTES
Pt will hold her breath.  Pt's 02 drops.  EDT reminds PT to take a deep breath.  Then 02 goes back up.

## 2025-03-09 NOTE — ED NOTES
Received phone call from mother April who states was able to reach friend Maine who plans to come to ER at 11-11:30am.

## 2025-03-09 NOTE — ED NOTES
Pt having episodes of oxygen desaturations into the 70s.  Provider notified.  Nasal trumpet placed to left nare with no complications.  Oxymask placed on 15 LPM, that pt intermittently keeps in place.
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
## 2025-03-09 NOTE — ED NOTES
EMERGENCY DEPARTMENT SIGN OUT NOTE        ED COURSE AND MEDICAL DECISION MAKING  Patient was signed out to me by Dr Valdez Chappell at 7:04 AM    In brief, Kajal Doan is a 24 year old female who initially presented for alcohol intoxication.      At time of sign out, disposition was pending improvement of intoxication.  Patient was monitored in the emergency department for over 12 hours with significant improvement of her intoxicated state.  At this time, the patient is requesting discharge home.  I do not see indication for admission or to hold the patient and I believe she is appropriate to make this decision at this time but will discharge into the care of a responsible adult.    12:56 PM Nurse reports that patient is clinically sober, able to ambulate, and would like to go home. Plan to discharge.     FINAL IMPRESSION    1. Alcoholic intoxication without complication        ED MEDS  Medications   haloperidol lactate (HALDOL) injection 3 mg (3 mg Intramuscular $Given 3/9/25 0448)   haloperidol lactate (HALDOL) injection 2 mg (2 mg Intramuscular $Given 3/9/25 0519)   LORazepam (ATIVAN) injection 2 mg (2 mg Intramuscular $Given 3/9/25 0530)   diphenhydrAMINE (BENADRYL) injection 50 mg (50 mg Intramuscular $Given 3/9/25 0619)   LORazepam (ATIVAN) injection 1 mg (1 mg Intramuscular $Given 3/9/25 0702)   LORazepam (ATIVAN) injection 1 mg (1 mg Intramuscular Not Given 3/9/25 0715)       LAB  Labs Ordered and Resulted from Time of ED Arrival to Time of ED Departure   URINE DRUG SCREEN PANEL - Abnormal       Result Value    Amphetamines Urine Screen Negative      Barbituates Urine Screen Negative      Benzodiazepine Urine Screen Negative      Cannabinoids Urine Screen Positive (*)     Cocaine Urine Screen Negative      Fentanyl Qual Urine Screen Negative      Opiates Urine Screen Negative      PCP Urine Screen Negative     HCG QUALITATIVE URINE - Normal    hCG Urine Qualitative Negative           RADIOLOGY    Head CT w/o  contrast   Final Result   IMPRESSION:   1.  Motion degraded exam.   2.  No acute intracranial process.             DISCHARGE MEDS  New Prescriptions    No medications on file       I, Carine Nichols, am serving as a scribe to document services personally performed by Dr. Keven Garrido, based on my observations and the provider's statements to me. I, Dr. Keven Garrido, attest that Carine Nichols is acting in a scribe capacity, has observed my performance of the services and has documented them in accordance with my direction.     Keven Garrido DO  [facility] EMERGENCY ROOM  [address] [place] [id number] [phone]         Keven Garrido DO  03/09/25 2394

## 2025-03-09 NOTE — ED NOTES
Pt unable to void on bedpan. Pt was more alert and oriented and able to talk more rationally. With RN and security at bedside, pt was helped to the bathroom at 0955. Pt given clean underwear and pad. Returned to bed and is calm and cooperative at this time.

## 2025-03-09 NOTE — ED NOTES
Received phone call from mom April who states she is reaching out to friend [place] to see if she can be available to  patient when ready for discharge.

## 2025-03-09 NOTE — ED NOTES
Patient yelling out and thrashing less in bed after 1st ativan dose but still yelling and thrashing. 2nd ativan dose pulled to give but patient then resting more, noted to desat more into 70s again so 2nd dose not given and wasted per Dr. Chappell at bedside. Patient tolerating oxymask better. Entered room and introduced self to patient and reviewed goal of removal of restraints when safe and goal of discharge home. Updated pt that per night shift RN tam said she is planning on calling at 8am to get update.     IV placed with prior agreement by patient with little incident and one time needing to remind that we are putting the IV in place. Tolerated well. Reinforced with coban.    Patient repositioned in bed with neck roll repositioned to reduce desating episodes. Oxymask in place and pt tolerating. Patient requesting to use bathroom. Offered bedpan or purewick. Patient verbalizing choosing bedpan. Assisted by 1:1 EDT and this RN with pulling down brief and pt then lying quietly. Instructed pt for her to lift her bottom how she had been doing. When pt not following directions asked pt if she would like to use bed pan or have brief pulled back up, pt verbalizes having brief pulled back up. Notified pt if she chooses to us purewick or bedpan again EDT and RN happy to assist. Pt verbalizes understanding.

## 2025-03-09 NOTE — ED NOTES
Per 1:1 patient pulse ox alternating between sats staying above 95% on room and and requiring oxymask when asleep. Patient with known sleep apnea.
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
## 2025-03-09 NOTE — ED NOTES
Patient states she is ready to go home. Has walked to bathroom with steady gait x2 without intervention needed. Called [place] but no answer. April called back and update given. Per April [place] is busy until 3pm but could  patient then. April states she will text [place] the plan. Updated patient of ride status.

## 2025-03-09 NOTE — ED TRIAGE NOTES
[facility] for etoh,  pt covered in vomit on arrival with spontaneous labored respirations, pt unable to anwer any questoins upon arival provider at bedside, per EMS pt drank a bottle of everclear and was found on the kitchen floor on her side covered in vomit     Triage Assessment (Adult)       Row Name 03/09/25 0147          Triage Assessment    Airway WDL WDL        Respiratory WDL    Respiratory WDL WDL        Peripheral/Neurovascular WDL    Peripheral Neurovascular WDL WDL        Cognitive/Neuro/Behavioral WDL    Cognitive/Neuro/Behavioral WDL X;level of consciousness;mood/behavior     Level of Consciousness other (see comments)  intoxicated     Orientation disoriented x 4

## 2025-03-09 NOTE — ED NOTES
Bed: [facility]ED-01  Expected date: 3/9/25  Expected time: 1:10 AM  Means of arrival: Ambulance  Comments:  EMS: [facility]  Age/gender: 24/F  CC: ETOH/STUPOR

## 2025-04-05 ENCOUNTER — APPOINTMENT (OUTPATIENT)
Dept: CT IMAGING | Facility: CLINIC | Age: 25
End: 2025-04-05
Attending: EMERGENCY MEDICINE
Payer: COMMERCIAL

## 2025-04-05 ENCOUNTER — HOSPITAL ENCOUNTER (EMERGENCY)
Facility: CLINIC | Age: 25
Discharge: HOME | End: 2025-04-05
Attending: EMERGENCY MEDICINE | Admitting: EMERGENCY MEDICINE
Payer: COMMERCIAL

## 2025-04-05 VITALS
RESPIRATION RATE: 22 BRPM | HEART RATE: 65 BPM | DIASTOLIC BLOOD PRESSURE: 124 MMHG | BODY MASS INDEX: 50 KG/M2 | SYSTOLIC BLOOD PRESSURE: 198 MMHG | HEIGHT: 59 IN | OXYGEN SATURATION: 99 % | WEIGHT: 248 LBS | TEMPERATURE: 98.8 F

## 2025-04-05 DIAGNOSIS — R10.11 RUQ ABDOMINAL PAIN: ICD-10-CM

## 2025-04-05 DIAGNOSIS — K80.50 BILIARY COLIC: Primary | ICD-10-CM

## 2025-04-05 DIAGNOSIS — R10.9 RIGHT FLANK PAIN: ICD-10-CM

## 2025-04-05 PROBLEM — E26.9 HYPERALDOSTERONISM, UNSPECIFIED: Status: ACTIVE | Noted: 2025-02-13

## 2025-04-05 PROBLEM — J45.909 ASTHMA: Status: ACTIVE | Noted: 2024-05-20

## 2025-04-05 PROBLEM — O11.9 CHRONIC HYPERTENSION WITH SUPERIMPOSED PRE-ECLAMPSIA: Status: ACTIVE | Noted: 2024-05-20

## 2025-04-05 PROBLEM — R79.89 ELEVATED LFTS: Status: ACTIVE | Noted: 2024-03-31

## 2025-04-05 PROBLEM — K52.9 GASTROENTERITIS: Status: ACTIVE | Noted: 2024-03-31

## 2025-04-05 PROBLEM — R73.9 HYPERGLYCEMIA: Status: ACTIVE | Noted: 2019-03-31

## 2025-04-05 PROBLEM — R73.03 PREDIABETES: Status: ACTIVE | Noted: 2022-08-29

## 2025-04-05 PROBLEM — O14.90 PREECLAMPSIA: Status: ACTIVE | Noted: 2024-05-10

## 2025-04-05 PROBLEM — F41.9 ANXIETY: Status: ACTIVE | Noted: 2024-05-20

## 2025-04-05 LAB
ALBUMIN SERPL BCG-MCNC: 4.1 G/DL (ref 3.5–5.2)
ALBUMIN UR-MCNC: 70 MG/DL
ALP SERPL-CCNC: 127 U/L (ref 40–150)
ALT SERPL W P-5'-P-CCNC: 58 U/L (ref 0–50)
ANION GAP SERPL CALCULATED.3IONS-SCNC: 11 MMOL/L (ref 7–15)
APPEARANCE UR: CLEAR
AST SERPL W P-5'-P-CCNC: 82 U/L (ref 0–45)
BILIRUB DIRECT SERPL-MCNC: 0.32 MG/DL (ref 0–0.3)
BILIRUB SERPL-MCNC: 0.6 MG/DL
BILIRUB UR QL STRIP: NEGATIVE
BUN SERPL-MCNC: 11.5 MG/DL (ref 6–20)
CALCIUM SERPL-MCNC: 9.1 MG/DL (ref 8.8–10.4)
CHLORIDE SERPL-SCNC: 104 MMOL/L (ref 98–107)
COLOR UR AUTO: YELLOW
CREAT SERPL-MCNC: 0.91 MG/DL (ref 0.51–0.95)
EGFRCR SERPLBLD CKD-EPI 2021: 89 ML/MIN/1.73M2
ERYTHROCYTE [DISTWIDTH] IN BLOOD BY AUTOMATED COUNT: 14.3 % (ref 10–15)
ETHANOL SERPL-MCNC: <0.01 G/DL
GLUCOSE SERPL-MCNC: 101 MG/DL (ref 70–99)
GLUCOSE UR STRIP-MCNC: NEGATIVE MG/DL
HCG UR QL: NEGATIVE
HCO3 SERPL-SCNC: 26 MMOL/L (ref 22–29)
HCT VFR BLD AUTO: 43.7 % (ref 35–47)
HGB BLD-MCNC: 14.7 G/DL (ref 11.7–15.7)
HGB UR QL STRIP: NEGATIVE
HOLD SPECIMEN: NORMAL
HOLD SPECIMEN: NORMAL
HYALINE CASTS: 4 /LPF
KETONES UR STRIP-MCNC: NEGATIVE MG/DL
LEUKOCYTE ESTERASE UR QL STRIP: ABNORMAL
LIPASE SERPL-CCNC: 42 U/L (ref 13–60)
MCH RBC QN AUTO: 26.7 PG (ref 26.5–33)
MCHC RBC AUTO-ENTMCNC: 33.6 G/DL (ref 31.5–36.5)
MCV RBC AUTO: 80 FL (ref 78–100)
MUCOUS THREADS #/AREA URNS LPF: PRESENT /LPF
NITRATE UR QL: NEGATIVE
PH UR STRIP: 6.5 [PH] (ref 5–7)
PLATELET # BLD AUTO: 357 10E3/UL (ref 150–450)
POTASSIUM SERPL-SCNC: 3.5 MMOL/L (ref 3.4–5.3)
PROT SERPL-MCNC: 7.2 G/DL (ref 6.4–8.3)
RBC # BLD AUTO: 5.5 10E6/UL (ref 3.8–5.2)
RBC URINE: 1 /HPF
SODIUM SERPL-SCNC: 141 MMOL/L (ref 135–145)
SP GR UR STRIP: 1.02 (ref 1–1.03)
SQUAMOUS EPITHELIAL: 8 /HPF
UROBILINOGEN UR STRIP-MCNC: NORMAL MG/DL
WBC # BLD AUTO: 9 10E3/UL (ref 4–11)
WBC URINE: 2 /HPF

## 2025-04-05 PROCEDURE — 84155 ASSAY OF PROTEIN SERUM: CPT | Performed by: EMERGENCY MEDICINE

## 2025-04-05 PROCEDURE — 36415 COLL VENOUS BLD VENIPUNCTURE: CPT | Performed by: EMERGENCY MEDICINE

## 2025-04-05 PROCEDURE — 83690 ASSAY OF LIPASE: CPT | Performed by: EMERGENCY MEDICINE

## 2025-04-05 PROCEDURE — 82077 ASSAY SPEC XCP UR&BREATH IA: CPT | Performed by: EMERGENCY MEDICINE

## 2025-04-05 PROCEDURE — 80051 ELECTROLYTE PANEL: CPT | Performed by: EMERGENCY MEDICINE

## 2025-04-05 PROCEDURE — 81025 URINE PREGNANCY TEST: CPT | Performed by: EMERGENCY MEDICINE

## 2025-04-05 PROCEDURE — 81001 URINALYSIS AUTO W/SCOPE: CPT | Performed by: EMERGENCY MEDICINE

## 2025-04-05 PROCEDURE — 258N000003 HC RX IP 258 OP 636: Performed by: EMERGENCY MEDICINE

## 2025-04-05 PROCEDURE — 96361 HYDRATE IV INFUSION ADD-ON: CPT

## 2025-04-05 PROCEDURE — 85027 COMPLETE CBC AUTOMATED: CPT | Performed by: EMERGENCY MEDICINE

## 2025-04-05 PROCEDURE — 96374 THER/PROPH/DIAG INJ IV PUSH: CPT

## 2025-04-05 PROCEDURE — 80048 BASIC METABOLIC PNL TOTAL CA: CPT | Performed by: EMERGENCY MEDICINE

## 2025-04-05 PROCEDURE — 96375 TX/PRO/DX INJ NEW DRUG ADDON: CPT

## 2025-04-05 PROCEDURE — 99285 EMERGENCY DEPT VISIT HI MDM: CPT | Mod: 25

## 2025-04-05 PROCEDURE — 250N000011 HC RX IP 250 OP 636: Performed by: EMERGENCY MEDICINE

## 2025-04-05 PROCEDURE — 80053 COMPREHEN METABOLIC PANEL: CPT | Performed by: EMERGENCY MEDICINE

## 2025-04-05 PROCEDURE — 74177 CT ABD & PELVIS W/CONTRAST: CPT

## 2025-04-05 RX ORDER — IOPAMIDOL 755 MG/ML
90 INJECTION, SOLUTION INTRAVASCULAR ONCE
Status: COMPLETED | OUTPATIENT
Start: 2025-04-05 | End: 2025-04-05

## 2025-04-05 RX ORDER — OXYCODONE HYDROCHLORIDE 5 MG/1
5 TABLET ORAL EVERY 6 HOURS PRN
Qty: 6 TABLET | Refills: 0 | Status: SHIPPED | OUTPATIENT
Start: 2025-04-05 | End: 2025-04-08

## 2025-04-05 RX ORDER — HYDROMORPHONE HYDROCHLORIDE 1 MG/ML
0.5 INJECTION, SOLUTION INTRAMUSCULAR; INTRAVENOUS; SUBCUTANEOUS
Status: DISCONTINUED | OUTPATIENT
Start: 2025-04-05 | End: 2025-04-05 | Stop reason: HOSPADM

## 2025-04-05 RX ORDER — ONDANSETRON 4 MG/1
4 TABLET, ORALLY DISINTEGRATING ORAL EVERY 6 HOURS PRN
Qty: 10 TABLET | Refills: 0 | Status: SHIPPED | OUTPATIENT
Start: 2025-04-05

## 2025-04-05 RX ORDER — IBUPROFEN 600 MG/1
600 TABLET, FILM COATED ORAL EVERY 6 HOURS PRN
Qty: 30 TABLET | Refills: 0 | Status: SHIPPED | OUTPATIENT
Start: 2025-04-05

## 2025-04-05 RX ORDER — KETOROLAC TROMETHAMINE 15 MG/ML
15 INJECTION, SOLUTION INTRAMUSCULAR; INTRAVENOUS ONCE
Status: COMPLETED | OUTPATIENT
Start: 2025-04-05 | End: 2025-04-05

## 2025-04-05 RX ORDER — ACETAMINOPHEN 500 MG
1000 TABLET ORAL 3 TIMES DAILY
Qty: 30 TABLET | Refills: 0 | Status: SHIPPED | OUTPATIENT
Start: 2025-04-05

## 2025-04-05 RX ORDER — ONDANSETRON 2 MG/ML
4 INJECTION INTRAMUSCULAR; INTRAVENOUS ONCE
Status: COMPLETED | OUTPATIENT
Start: 2025-04-05 | End: 2025-04-05

## 2025-04-05 RX ADMIN — IOPAMIDOL 90 ML: 755 INJECTION, SOLUTION INTRAVENOUS at 09:52

## 2025-04-05 RX ADMIN — SODIUM CHLORIDE 500 ML: 0.9 INJECTION, SOLUTION INTRAVENOUS at 07:44

## 2025-04-05 RX ADMIN — KETOROLAC TROMETHAMINE 15 MG: 15 INJECTION, SOLUTION INTRAMUSCULAR; INTRAVENOUS at 07:43

## 2025-04-05 RX ADMIN — ONDANSETRON 4 MG: 2 INJECTION, SOLUTION INTRAMUSCULAR; INTRAVENOUS at 07:44

## 2025-04-05 RX ADMIN — HYDROMORPHONE HYDROCHLORIDE 0.5 MG: 1 INJECTION, SOLUTION INTRAMUSCULAR; INTRAVENOUS; SUBCUTANEOUS at 07:51

## 2025-04-05 ASSESSMENT — ACTIVITIES OF DAILY LIVING (ADL)
ADLS_ACUITY_SCORE: 41

## 2025-04-05 ASSESSMENT — COLUMBIA-SUICIDE SEVERITY RATING SCALE - C-SSRS
2. HAVE YOU ACTUALLY HAD ANY THOUGHTS OF KILLING YOURSELF IN THE PAST MONTH?: NO
1. IN THE PAST MONTH, HAVE YOU WISHED YOU WERE DEAD OR WISHED YOU COULD GO TO SLEEP AND NOT WAKE UP?: NO
6. HAVE YOU EVER DONE ANYTHING, STARTED TO DO ANYTHING, OR PREPARED TO DO ANYTHING TO END YOUR LIFE?: NO

## 2025-04-05 NOTE — DISCHARGE INSTRUCTIONS
If the pain returns, please take: 600 mg of ibuprofen AND 1000 mg of tylenol.  You can take Zofran as needed for nausea.  Oxycodone for breakthrough pain.  If is not improving within an hour, or you develop a fever, vomiting, please go back to the emergency department.  Otherwise somebody will call from surgery scheduling to help set up an appointment for you.    Please try to follow the gallbladder low-fat diet to help reduce pain flares

## 2025-04-05 NOTE — ED PROVIDER NOTES
EMERGENCY DEPARTMENT ENCOUNTER      NAME: Kajal Doan  AGE: 24 year old female  YOB: 2000  MRN: [id number]  EVALUATION DATE & TIME: 4/5/2025  7:15 AM    PCP: [facility]    ED PROVIDER: Killian Ramirez M.D.      Chief Complaint   Patient presents with    Flank Pain         FINAL IMPRESSION:  1. Biliary colic    2. Right flank pain    3. RUQ abdominal pain          ED COURSE & MEDICAL DECISION MAKING:    Pertinent Labs & Imaging studies reviewed below.  All EKGs below represent my independent interpretation.   ED Course as of 04/05/25 1312   Sat Apr 05, 2025   0727 Patient is a 24-year-old woman who presents with severe mid to lower back pain with associated nausea.  It began suddenly last night, resolved and then returned this morning.  No precipitating event.  On arrival she is hypertensive at 216/135 with normal temperature, heart rate and oxygenation.  She is tearful, uncomfortable appearing.  She has no tenderness to palpation along the midline or musculature of the back.  Symptoms are worrisome for kidney stone, possible referred pain from biliary colic.  IV Toradol, Zofran, fluids ordered.  Screening lab work ordered.  Plan to ensure she is not pregnant, otherwise CT is warranted to screen for kidney stone, cholecystitis   0813 AST(!): 82  Minimal elevation. Etoh level added on   0913 HCG Qual Urine: Negative   0958 Alcohol ethyl: <0.01   1020 CT Abdomen Pelvis w Contrast  1.  Gallbladder is distended with diffuse wall edema. Findings are concerning for acute cholecystitis.   2.  Right adnexal cyst measuring 3.5 cm. This is in the physiologic size range and does not require specific follow-up.   1026 Pain is 5 out of 10 currently she feels much more comfortable, does not need additional analgesia.  Given her reassuring lab work, but concerning CT scan, plan to reach out to general surgery.  She is n.p.o. since last night   1139 I spoke to Dr. Reynolds with general surgery.   He reviewed CT scan, lab work, clinical history.  Thinks patient could potentially go home with analgesia, return precautions and outpatient referral if she is feeling better.   7389 I rechecked the patient, pain is still about 5 out of 10.  Plan to trial some sips of water, crackers to see how she tolerates it.  If she continues to do well then she may be able to go home with outpatient referral.       Additional ED Course timestamps entered by medical scribe:  7:22 AM I met with the patient, obtained history, performed an initial exam, and discussed options and plan for diagnostics and treatment here in the ED.  10:27 AM Rechecked and reevaluated the patient.  10:30 AM Paged General Surgery.  11:34 AM Spoke to Dr. Reynolds, General Surgery.  12:35 PM Rechecked and reevaluated the patient. Patient is doing great after PO challenge.  12:55 PM We discussed the plan for discharge and the patient is agreeable. Reviewed supportive cares, symptomatic treatment, outpatient follow up, and reasons to return to the Emergency Department. All questions and concerns were addressed. Patient to be discharged by ED RN.       Medical Decision Making    Discharge. I prescribed additional prescription strength medication(s) as charted. I considered admission, but discharged patient after significant clinical improvement.    MIPS (CTPE, Dental pain, Lange, Sinusitis, Asthma/COPD, Head Trauma): Not Applicable    SEPSIS: None      MEDICATIONS GIVEN IN THE EMERGENCY:  Medications   HYDROmorphone (PF) (DILAUDID) injection 0.5 mg (0.5 mg Intravenous $Given 4/5/25 9161)   ketorolac (TORADOL) injection 15 mg (15 mg Intravenous $Given 4/5/25 3543)   ondansetron (ZOFRAN) injection 4 mg (4 mg Intravenous $Given 4/5/25 7508)   sodium chloride 0.9% BOLUS 500 mL (0 mLs Intravenous Stopped 4/5/25 8904)   iopamidol (ISOVUE-370) solution 90 mL (90 mLs Intravenous $Given 4/5/25 8131)         NEW PRESCRIPTIONS STARTED AT TODAY'S ER VISIT  Discharge  "Medication List as of 4/5/2025 12:48 PM        START taking these medications    Details   acetaminophen (TYLENOL) 500 MG tablet Take 2 tablets (1,000 mg) by mouth 3 times daily., Disp-30 tablet, R-0, E-Prescribe      ibuprofen (ADVIL/MOTRIN) 600 MG tablet Take 1 tablet (600 mg) by mouth every 6 hours as needed for moderate pain., Disp-30 tablet, R-0, E-Prescribe      ondansetron (ZOFRAN ODT) 4 MG ODT tab Take 1 tablet (4 mg) by mouth every 6 hours as needed for nausea., Disp-10 tablet, R-0, E-Prescribe      oxyCODONE (ROXICODONE) 5 MG tablet Take 1 tablet (5 mg) by mouth every 6 hours as needed for pain., Disp-6 tablet, R-0, E-Prescribe                =================================================================    HPI    Kajal Doan is a 24 year old female, with a significant medical history of exercise induced asthma, anxiety, hypertension, and hyperglycemia, who presents to this ED for evaluation of back pain.     Patient reports back pain that started yesterday when she was sitting [employer] work. The pain was a sudden onset. Patient thought the pain was in her abdomen but it was in the back. Patient had an episode of vomiting last night and she states the pain resolved shortly after. This morning she wok up with the back pain. Patient had another episode of vomiting this morning. She states this pain has not resolved. She rates the pain at a 8/10.     Patient denies any other complaints at this time.       VITALS:  BP (!) 198/124   Pulse 65   Temp 98.8  F (37.1  C) (Oral)   Resp 22   Ht 1.499 m (4' 11\")   Wt 112.5 kg (248 lb)   SpO2 99%   BMI 50.09 kg/m      PHYSICAL EXAM    Constitutional: uncomfortable appearing and tearful.  HENT: Atraumatic, mucous membranes moist, nose normal. Neck- Supple, gross ROM intact.   Eyes: Pupils mid-range, conjunctiva without injection, no discharge.   Respiratory: Clear to auscultation bilaterally, no respiratory distress, no wheezing, speaks full sentences easily. " No cough.  Cardiovascular: Normal heart rate, regular rhythm, no murmurs.   GI: Soft, no masses. RUQ tenderness. No midline spinal tenderness.  Musculoskeletal: Moving all 4 extremities intentionally and without pain. No obvious deformity.  Skin: Warm, dry, no rash.  Neurologic: Alert & oriented x 3, cranial nerves grossly intact.  Psychiatric: Affect normal, cooperative.      I, Juan Manuel Harish am serving as a scribe to document services personally performed by Dr. Killian Ramirez based on my observation and the provider's statements to me. I, Killian Rmairez MD attest that Juan Manuel Harish is acting in a scribe capacity, has observed my performance of the services and has documented them in accordance with my direction.    Killian Ramirez M.D.  Emergency Medicine  [facility] EMERGENCY ROOM  [address] [facility] [id number]  [phone]  Dept: [phone]       Killian Ramirez MD  04/05/25 2152

## 2025-04-05 NOTE — ED TRIAGE NOTES
Pt presents to the ED with sudden onset of flank pain yesterday while sitting at work, pt reports vomiting and the pain went away. Today pt woke with pain again. Pt reports vomiting again with some abd pain.      Triage Assessment (Adult)       Row Name 04/05/25 0722          Triage Assessment    Airway WDL WDL        Respiratory WDL    Respiratory WDL WDL        Skin Circulation/Temperature WDL    Skin Circulation/Temperature WDL WDL        Cardiac WDL    Cardiac WDL WDL        Peripheral/Neurovascular WDL    Peripheral Neurovascular WDL WDL        Cognitive/Neuro/Behavioral WDL    Cognitive/Neuro/Behavioral WDL WDL

## 2025-04-14 ENCOUNTER — LAB (OUTPATIENT)
Dept: LAB | Facility: CLINIC | Age: 25
End: 2025-04-14
Payer: COMMERCIAL

## 2025-04-14 ENCOUNTER — OFFICE VISIT (OUTPATIENT)
Dept: SURGERY | Facility: CLINIC | Age: 25
End: 2025-04-14
Payer: COMMERCIAL

## 2025-04-14 VITALS — BODY MASS INDEX: 48.38 KG/M2 | HEIGHT: 59 IN | WEIGHT: 240 LBS

## 2025-04-14 DIAGNOSIS — K80.50 BILIARY COLIC: ICD-10-CM

## 2025-04-14 DIAGNOSIS — R10.11 RUQ ABDOMINAL PAIN: ICD-10-CM

## 2025-04-14 LAB
ALBUMIN SERPL BCG-MCNC: 4.2 G/DL (ref 3.5–5.2)
ALP SERPL-CCNC: 87 U/L (ref 40–150)
ALT SERPL W P-5'-P-CCNC: 31 U/L (ref 0–50)
ANION GAP SERPL CALCULATED.3IONS-SCNC: 11 MMOL/L (ref 7–15)
AST SERPL W P-5'-P-CCNC: 27 U/L (ref 0–45)
BILIRUB SERPL-MCNC: 0.7 MG/DL
BUN SERPL-MCNC: 15.6 MG/DL (ref 6–20)
CALCIUM SERPL-MCNC: 9.7 MG/DL (ref 8.8–10.4)
CHLORIDE SERPL-SCNC: 106 MMOL/L (ref 98–107)
CREAT SERPL-MCNC: 0.87 MG/DL (ref 0.51–0.95)
EGFRCR SERPLBLD CKD-EPI 2021: >90 ML/MIN/1.73M2
GLUCOSE SERPL-MCNC: 92 MG/DL (ref 70–99)
HCO3 SERPL-SCNC: 22 MMOL/L (ref 22–29)
POTASSIUM SERPL-SCNC: 4.4 MMOL/L (ref 3.4–5.3)
PROT SERPL-MCNC: 7.4 G/DL (ref 6.4–8.3)
SODIUM SERPL-SCNC: 139 MMOL/L (ref 135–145)

## 2025-04-14 PROCEDURE — 99204 OFFICE O/P NEW MOD 45 MIN: CPT | Performed by: SURGERY

## 2025-04-14 PROCEDURE — 80053 COMPREHEN METABOLIC PANEL: CPT

## 2025-04-14 PROCEDURE — 36415 COLL VENOUS BLD VENIPUNCTURE: CPT

## 2025-04-14 RX ORDER — SPIRONOLACTONE 25 MG/1
1 TABLET ORAL DAILY
COMMUNITY
Start: 2025-03-24 | End: 2026-03-24

## 2025-04-14 NOTE — LETTER
4/14/2025      Kajal Doan  [address] [place] [address]      Dear Colleague,    Thank you for referring your patient, Kajal Doan, to the [facility] [place]. Please see a copy of my visit note below.    General Surgery Consult    HPI:    History of Present Illness    Evette Doan, 24-year-old female    - Experienced belly pain a week ago, described as more in the back than the abdomen.  - Vomiting occurred the day before the onset of pain, which stopped before the pain began.  - No fever or chills reported.  - First occurrence of such pain, no prior similar episodes.  - Visited the ER for the pain, has been monitoring diet since then, which seems to help.  - No severe pain since the ER visit.  - History of high blood pressure, currently managed with medication.  - Tested for diabetes, not diagnosed but close to the threshold.  - Developed preeclampsia during the last two months of pregnancy, required early delivery.  - Suspected sleep apnea, mentioned by hospital staff.         Allergies:Patient has no known allergies.    Past Medical History:   Diagnosis Date     Pediatric hypertension        No past surgical history on file.    CURRENT MEDS:  Current Outpatient Medications   Medication Sig Dispense Refill     acetaminophen (TYLENOL) 500 MG tablet Take 2 tablets (1,000 mg) by mouth 3 times daily. 30 tablet 0     albuterol (PROAIR HFA, PROVENTIL HFA, VENTOLIN HFA) 108 (90 BASE) MCG/ACT inhaler Take two puffs 15 minutes before any exercise. 1 Inhaler 1     amphetamine-dextroamphetamine (ADDERALL XR) 20 MG 24 hr capsule Take 1 capsule (20 mg) by mouth daily 30 capsule 0     cholecalciferol (VITAMIN D3) 5000 UNITS CAPS capsule Take  by mouth daily.       FISH OIL        fluconazole (DIFLUCAN) 150 MG tablet TAKE 1 TABLET BY MOUTH ONCE FOR 1 DOSE 1 tablet 0     FOLIC ACID PO Take 1 mg by mouth daily.       hydrocortisone 2.5 % cream Apply to affected areas around nose daily  "for 3-4 days, then stop. 30 g 3     ibuprofen (ADVIL/MOTRIN) 600 MG tablet Take 1 tablet (600 mg) by mouth every 6 hours as needed for moderate pain. 30 tablet 0     ondansetron (ZOFRAN ODT) 4 MG ODT tab Take 1 tablet (4 mg) by mouth every 6 hours as needed for nausea. 10 tablet 0     spironolactone (ALDACTONE) 25 MG tablet Take 1 tablet by mouth daily.       atenolol (TENORMIN) 50 MG tablet Take 1 tablet (50 mg) by mouth daily 30 tablet 0     escitalopram (LEXAPRO) 10 MG tablet Take 1 tablet (10 mg) by mouth daily 30 tablet 3     No current facility-administered medications for this visit.     Labetalol  Nifedipine      Family History   Problem Relation Age of Onset     Diabetes Mother         reports that she has never smoked. She has never used smokeless tobacco. She reports that she does not drink alcohol and does not use drugs.    Review of Systems - Pertinent items are noted in HPI.    Ht 1.499 m (4' 11\")   Wt 108.9 kg (240 lb)   BMI 48.47 kg/m      EXAM:  GENERAL: NAD  MOUTH: Mucus membranes moist  SKIN: Grossly intact  EYES: No icterus  CARDIAC: RRR   CHEST/LUNG: NLB  ABDOMEN: Soft, RUQ non-tender, Sharp's sign negative  NEURO: Alert and oriented, nonfocal  PSYCHIATRIC: normal affect    LABS:    LFTs mildly elevated    IMAGES:     CT 4/5/25:    IMPRESSION:   1.  Gallbladder is distended with diffuse wall edema. Findings are concerning for acute cholecystitis.   2.  Right adnexal cyst measuring 3.5 cm. This is in the physiologic size range and does not require specific follow-up.    Assessment/Plan:     Pt with signs and symptoms consistent with biliary colic.     That said, she has no stones or sludge on US and LFTs were abnormal on last check.  I've ordered and US and repeat LFTs.    Discussed risks/benefits/alternatives to removal of the gallbladder if biliary colic is confirmed.    The risk discussion included, but was not limited to, death, myocardial infarction, pneumonia, urinary tract infection, " deep venous thrombosis with or without pulmonary embolus, abdominal infection from bowel injury or abscess, bowel obstruction, wound infection, and bleeding.    Specific risks related to cholecystectomy include bile duct injury, bile leak, retained common bile duct stone, postcholecystectomy diarrhea and these complications may require additional treatment.    The potential that gallbladder removal will NOT be of benefit was also reviewed.    Furthermore, alternative therapies and the option for no therapy were also reviewed.     I'll call her after US and labs to confirm final plan.    Consent was obtained from the patient to use an AI documentation tool in the creation of this note.    Denton Figueroa MD       Again, thank you for allowing me to participate in the care of your patient.        Sincerely,        Denton Figueroa MD    Electronically signed

## 2025-04-14 NOTE — PROGRESS NOTES
General Surgery Consult    HPI:    History of Present Illness    Evette Doan, 24-year-old female    - Experienced belly pain a week ago, described as more in the back than the abdomen.  - Vomiting occurred the day before the onset of pain, which stopped before the pain began.  - No fever or chills reported.  - First occurrence of such pain, no prior similar episodes.  - Visited the ER for the pain, has been monitoring diet since then, which seems to help.  - No severe pain since the ER visit.  - History of high blood pressure, currently managed with medication.  - Tested for diabetes, not diagnosed but close to the threshold.  - Developed preeclampsia during the last two months of pregnancy, required early delivery.  - Suspected sleep apnea, mentioned by hospital staff.         Allergies:Patient has no known allergies.    Past Medical History:   Diagnosis Date    Pediatric hypertension        No past surgical history on file.    CURRENT MEDS:  Current Outpatient Medications   Medication Sig Dispense Refill    acetaminophen (TYLENOL) 500 MG tablet Take 2 tablets (1,000 mg) by mouth 3 times daily. 30 tablet 0    albuterol (PROAIR HFA, PROVENTIL HFA, VENTOLIN HFA) 108 (90 BASE) MCG/ACT inhaler Take two puffs 15 minutes before any exercise. 1 Inhaler 1    amphetamine-dextroamphetamine (ADDERALL XR) 20 MG 24 hr capsule Take 1 capsule (20 mg) by mouth daily 30 capsule 0    cholecalciferol (VITAMIN D3) 5000 UNITS CAPS capsule Take  by mouth daily.      FISH OIL       fluconazole (DIFLUCAN) 150 MG tablet TAKE 1 TABLET BY MOUTH ONCE FOR 1 DOSE 1 tablet 0    FOLIC ACID PO Take 1 mg by mouth daily.      hydrocortisone 2.5 % cream Apply to affected areas around nose daily for 3-4 days, then stop. 30 g 3    ibuprofen (ADVIL/MOTRIN) 600 MG tablet Take 1 tablet (600 mg) by mouth every 6 hours as needed for moderate pain. 30 tablet 0    ondansetron (ZOFRAN ODT) 4 MG ODT tab Take 1 tablet (4 mg) by mouth every 6 hours as needed for  "nausea. 10 tablet 0    spironolactone (ALDACTONE) 25 MG tablet Take 1 tablet by mouth daily.      atenolol (TENORMIN) 50 MG tablet Take 1 tablet (50 mg) by mouth daily 30 tablet 0    escitalopram (LEXAPRO) 10 MG tablet Take 1 tablet (10 mg) by mouth daily 30 tablet 3     No current facility-administered medications for this visit.     Labetalol  Nifedipine      Family History   Problem Relation Age of Onset    Diabetes Mother         reports that she has never smoked. She has never used smokeless tobacco. She reports that she does not drink alcohol and does not use drugs.    Review of Systems - Pertinent items are noted in HPI.    Ht 1.499 m (4' 11\")   Wt 108.9 kg (240 lb)   BMI 48.47 kg/m      EXAM:  GENERAL: NAD  MOUTH: Mucus membranes moist  SKIN: Grossly intact  EYES: No icterus  CARDIAC: RRR   CHEST/LUNG: NLB  ABDOMEN: Soft, RUQ non-tender, Sharp's sign negative  NEURO: Alert and oriented, nonfocal  PSYCHIATRIC: normal affect    LABS:    LFTs mildly elevated    IMAGES:     CT 4/5/25:    IMPRESSION:   1.  Gallbladder is distended with diffuse wall edema. Findings are concerning for acute cholecystitis.   2.  Right adnexal cyst measuring 3.5 cm. This is in the physiologic size range and does not require specific follow-up.    Assessment/Plan:     Pt with signs and symptoms consistent with biliary colic.     That said, she has no stones or sludge on US and LFTs were abnormal on last check.  I've ordered and US and repeat LFTs.    Discussed risks/benefits/alternatives to removal of the gallbladder if biliary colic is confirmed.    The risk discussion included, but was not limited to, death, myocardial infarction, pneumonia, urinary tract infection, deep venous thrombosis with or without pulmonary embolus, abdominal infection from bowel injury or abscess, bowel obstruction, wound infection, and bleeding.    Specific risks related to cholecystectomy include bile duct injury, bile leak, retained common bile duct " stone, postcholecystectomy diarrhea and these complications may require additional treatment.    The potential that gallbladder removal will NOT be of benefit was also reviewed.    Furthermore, alternative therapies and the option for no therapy were also reviewed.     I'll call her after US and labs to confirm final plan.    Consent was obtained from the patient to use an AI documentation tool in the creation of this note.    Denton Figueroa MD

## 2025-04-19 ENCOUNTER — HOSPITAL ENCOUNTER (OUTPATIENT)
Dept: ULTRASOUND IMAGING | Facility: CLINIC | Age: 25
Discharge: HOME | End: 2025-04-19
Attending: SURGERY | Admitting: SURGERY
Payer: COMMERCIAL

## 2025-04-19 DIAGNOSIS — K80.50 BILIARY COLIC: ICD-10-CM

## 2025-04-19 PROCEDURE — 76705 ECHO EXAM OF ABDOMEN: CPT

## 2025-04-28 ENCOUNTER — TELEPHONE (OUTPATIENT)
Dept: SURGERY | Facility: CLINIC | Age: 25
End: 2025-04-28
Payer: COMMERCIAL

## 2025-07-10 NOTE — TELEPHONE ENCOUNTER
"Reason for Call:  Other call back    Detailed comments: mom wanted to leave the following msg for Dr. aMrr.              \"Thank you and great job last week with Evette, all test are negative, hooray!\"   \"Please provide the name of the allergy practice, I will get the appointment for her\"    Phone Number Patient can be reached at: Home number on file [phone] (home)    Best Time: anytime    Can we leave a detailed message on this number? YES    Call taken on 7/26/2017 at 8:17 AM by Marlys Merino      " DISPLAY PLAN FREE TEXT